# Patient Record
Sex: MALE | Race: WHITE | NOT HISPANIC OR LATINO | Employment: OTHER | URBAN - METROPOLITAN AREA
[De-identification: names, ages, dates, MRNs, and addresses within clinical notes are randomized per-mention and may not be internally consistent; named-entity substitution may affect disease eponyms.]

---

## 2017-08-21 LAB — HCV AB SER-ACNC: NEGATIVE

## 2018-10-04 ENCOUNTER — OFFICE VISIT (OUTPATIENT)
Dept: FAMILY MEDICINE CLINIC | Facility: CLINIC | Age: 73
End: 2018-10-04
Payer: COMMERCIAL

## 2018-10-04 VITALS
WEIGHT: 206.9 LBS | TEMPERATURE: 97.6 F | OXYGEN SATURATION: 95 % | HEIGHT: 72 IN | BODY MASS INDEX: 28.02 KG/M2 | SYSTOLIC BLOOD PRESSURE: 132 MMHG | RESPIRATION RATE: 20 BRPM | HEART RATE: 71 BPM | DIASTOLIC BLOOD PRESSURE: 74 MMHG

## 2018-10-04 DIAGNOSIS — N40.1 BENIGN PROSTATIC HYPERPLASIA WITH LOWER URINARY TRACT SYMPTOMS, SYMPTOM DETAILS UNSPECIFIED: Chronic | ICD-10-CM

## 2018-10-04 DIAGNOSIS — M35.3 PMR (POLYMYALGIA RHEUMATICA) (HCC): ICD-10-CM

## 2018-10-04 DIAGNOSIS — E78.5 HYPERLIPIDEMIA, UNSPECIFIED HYPERLIPIDEMIA TYPE: Chronic | ICD-10-CM

## 2018-10-04 DIAGNOSIS — G47.00 INSOMNIA, UNSPECIFIED TYPE: Primary | ICD-10-CM

## 2018-10-04 DIAGNOSIS — Z79.52 LONG TERM SYSTEMIC STEROID USER: ICD-10-CM

## 2018-10-04 PROBLEM — Z79.899 METHOTREXATE, LONG TERM, CURRENT USE: Status: ACTIVE | Noted: 2018-08-23

## 2018-10-04 PROBLEM — Z79.631 METHOTREXATE, LONG TERM, CURRENT USE: Status: ACTIVE | Noted: 2018-08-23

## 2018-10-04 PROBLEM — D47.2 MONOCLONAL GAMMOPATHY: Status: ACTIVE | Noted: 2017-09-06

## 2018-10-04 PROCEDURE — G0439 PPPS, SUBSEQ VISIT: HCPCS | Performed by: FAMILY MEDICINE

## 2018-10-04 PROCEDURE — 1160F RVW MEDS BY RX/DR IN RCRD: CPT | Performed by: FAMILY MEDICINE

## 2018-10-04 PROCEDURE — 90662 IIV NO PRSV INCREASED AG IM: CPT

## 2018-10-04 PROCEDURE — 99214 OFFICE O/P EST MOD 30 MIN: CPT | Performed by: FAMILY MEDICINE

## 2018-10-04 PROCEDURE — 1160F RVW MEDS BY RX/DR IN RCRD: CPT

## 2018-10-04 PROCEDURE — 1125F AMNT PAIN NOTED PAIN PRSNT: CPT

## 2018-10-04 PROCEDURE — G0008 ADMIN INFLUENZA VIRUS VAC: HCPCS

## 2018-10-04 PROCEDURE — 1170F FXNL STATUS ASSESSED: CPT

## 2018-10-04 RX ORDER — ALFUZOSIN HYDROCHLORIDE 10 MG/1
10 TABLET, EXTENDED RELEASE ORAL DAILY
COMMUNITY
End: 2019-10-07 | Stop reason: ALTCHOICE

## 2018-10-04 RX ORDER — PREDNISONE 1 MG/1
5 TABLET ORAL DAILY
COMMUNITY
Start: 2018-05-30 | End: 2019-10-07 | Stop reason: ALTCHOICE

## 2018-10-04 RX ORDER — ZOLPIDEM TARTRATE 10 MG/1
10 TABLET ORAL
Qty: 90 TABLET | Refills: 0 | Status: SHIPPED | OUTPATIENT
Start: 2018-10-04 | End: 2019-10-07 | Stop reason: SDUPTHER

## 2018-10-04 RX ORDER — FOLIC ACID 1 MG/1
1 TABLET ORAL DAILY
COMMUNITY
Start: 2018-08-23 | End: 2019-10-07 | Stop reason: ALTCHOICE

## 2018-10-04 RX ORDER — HYDROXYCHLOROQUINE SULFATE 200 MG/1
200 TABLET, FILM COATED ORAL 2 TIMES DAILY
COMMUNITY
Start: 2018-06-21 | End: 2019-10-07 | Stop reason: ALTCHOICE

## 2018-10-04 NOTE — PATIENT INSTRUCTIONS
IF YOU HAVE NEVER HAD A TDAP SHOT (TETANUS/DIPHTHERIA/PERTUSSIS)  TALK TO YOUR PHARMACIST ABOUT GETTING ONE : GOOD FOR 10 YRS:ESPECIALLY IMPORTATN IF YOU HAVE YOUNG CHILDREN OR GRANDCHILDREN    GET A YEARLY FLU SHOT IN THE FALL : IF OVER 65 GET "HIGH DOSE"     ASK PHARMACIST ABOUT "SHINGRIX" THE NEW SHINGLES VACCINE IF OVER AGE 50    Colonoscopy due soon     CURRENT [employer] TIPS ON EXERCISE:    IF YOU HAVE CONDITIONS THAT PREVENT AEROBIC EXERCISE:    WALK 30 MINUTES AT LEAST 5 DAYS PER WEEK; MAY BREAK IT UP INTO 10-  15 MINUTE SESSIONS   GET SOME RESISTANCE EXERCISES USING THE MAJOR MUSCLE GROUPS 2-3 TIMES PER WEEK     IF YOU ARE PHYSICALLY ABLE:    TRY TO GET 10,000 STEPS 3 TIMES PER WEEK  PLUS  GO "ONLINE" TO CHECK [employer] HEART RATE FOR YOUR AGE AND DO AEROBIC EXERCISES (JOG/STATIONARY BIKE/ELLIPTICAL) FOR 20-30 MINUTES 2-3 TIMES PER WEEK

## 2018-10-04 NOTE — PROGRESS NOTES
Assessment/Plan:    HLD (hyperlipidemia)  Low hdl       Diagnoses and all orders for this visit:    Insomnia, unspecified type  -     influenza vaccine, 4485-7151, high-dose, PF 0 5 mL, for patients 65 yr+ (FLUZONE HIGH-DOSE)  -     zolpidem (AMBIEN) 10 mg tablet; Take 1 tablet (10 mg total) by mouth daily at bedtime as needed for sleep    Long term systemic steroid user    Hyperlipidemia, unspecified hyperlipidemia type    Benign prostatic hyperplasia with lower urinary tract symptoms, symptom details unspecified    PMR (polymyalgia rheumatica) ([facility])    Other orders  -     alfuzosin (UROXATRAL) 10 mg 24 hr tablet; Take 10 mg by mouth daily  -     folic acid (FOLVITE) 1 mg tablet; Take 1 mg by mouth daily  -     hydroxychloroquine (PLAQUENIL) 200 mg tablet; Take 200 mg by mouth 2 (two) times a day  -     predniSONE 5 mg tablet; Take 5 mg by mouth daily          Subjective:      Patient ID: Benton Capps is a 72 y o  male  Patient comes today for his regular follow-up of chronic conditions  Also update on the Medicare wellness visit  In March she had laminectomy which was quite successful in relieving spinal stenosis symptoms  He remains on low-dose prednisone and methotrexate for the PMR and rheumatoid arthritis  He had some med changes for the BPH  The following portions of the patient's history were reviewed and updated as appropriate: allergies, current medications, past family history, past medical history, past social history, past surgical history and problem list     Review of Systems   Constitutional: Negative for activity change and appetite change  HENT: Negative for trouble swallowing  Respiratory: Negative for cough and shortness of breath  Cardiovascular: Negative for chest pain, palpitations and leg swelling  Gastrointestinal: Negative for abdominal pain, blood in stool and bowel incontinence  Endocrine: Negative for polyuria     Genitourinary: Positive for difficulty urinating and frequency  Negative for hematuria  Musculoskeletal: Positive for arthralgias  Negative for back pain  Skin: Negative for rash  Neurological: Negative for dizziness  Psychiatric/Behavioral: Negative for behavioral problems  The patient is not nervous/anxious  Objective:  Vitals:    10/04/18 0924   BP: 132/74   BP Location: Left arm   Patient Position: Sitting   Cuff Size: Standard   Pulse: 71   Resp: 20   Temp: 97 6 °F (36 4 °C)   SpO2: 95%   Weight: 93 8 kg (206 lb 14 4 oz)   Height: 6' (1 829 m)       Physical Exam   Constitutional: He appears well-developed and well-nourished  HENT:   Head: Normocephalic and atraumatic  Eyes: Conjunctivae are normal    Neck: Neck supple  No thyromegaly present  Cardiovascular: Normal rate, regular rhythm, normal heart sounds and intact distal pulses  No murmur heard  Pulmonary/Chest: Effort normal and breath sounds normal  No respiratory distress  Musculoskeletal: He exhibits no edema  Lymphadenopathy:     He has no cervical adenopathy  Skin: Skin is warm and dry  Psychiatric: He has a normal mood and affect  His behavior is normal        Assessment and Plan:    Problem List Items Addressed This Visit     Long term systemic steroid user    PMR (polymyalgia rheumatica) (HCC)    HLD (hyperlipidemia) (Chronic)     Low hdl         Benign prostatic hyperplasia with lower urinary tract symptoms (Chronic)      Other Visit Diagnoses     Insomnia, unspecified type    -  Primary    Relevant Medications    zolpidem (AMBIEN) 10 mg tablet    Other Relevant Orders    influenza vaccine, [id number], high-dose, PF 0 5 mL, for patients 65 yr+ (FLUZONE HIGH-DOSE)        There are no preventive care reminders to display for this patient  HPI:  Srinath Calloway is a 72 y o  male here for his Subsequent Wellness Visit      Patient Active Problem List   Diagnosis    Long term systemic steroid user    Methotrexate, long term, current use    Monoclonal gammopathy    PMR (polymyalgia rheumatica) (HCC)    HLD (hyperlipidemia)    Benign prostatic hyperplasia with lower urinary tract symptoms     Past Medical History:   Diagnosis Date    Appendicitis     AGE 37    Depression     Deviated nasal septum 03/1996    Head trauma 11/2003    DUE TO FALL; HOSPITALIZATION    Heart murmur     ONSET AT AGE 23    Herniated disc, cervical 06/1996     Past Surgical History:   Procedure Laterality Date    APPENDECTOMY      AGE 37    BACK SURGERY      CHOLECYSTECTOMY      CLAVICLE SURGERY Left     HARDWARE IN    DEBRIDEMENT TENNIS ELBOW Right     R TENNIS ELBOW SX    LUMBAR [place] [address] [place] SURGERY  06/1996    HERNIATED [address] [place]; X2    RHINOPLASTY  03/1996    DEDVIATED NASAL SEPTUM    UVULOPALATOPHARYNGOPLASTY      LISTED 2 X; RESOLVED WITH SURGERY     Family History   Problem Relation Age of Onset    Heart failure Father     Brain cancer Sister      History   Smoking Status    Never Smoker   Smokeless Tobacco    Not on file     Comment: FORMER SMOKER, CIGARETTE TOBACCO USE; DATE QUIT: 1/1/03; NO PASSIVE SMOKE EXPOSURE AS PER [employer]     History   Alcohol Use No      History   Drug Use No       Current Outpatient Prescriptions   Medication Sig Dispense Refill    alfuzosin (UROXATRAL) 10 mg 24 hr tablet Take 10 mg by mouth daily      folic acid (FOLVITE) 1 mg tablet Take 1 mg by mouth daily      hydroxychloroquine (PLAQUENIL) 200 mg tablet Take 200 mg by mouth 2 (two) times a day      predniSONE 5 mg tablet Take 5 mg by mouth daily      zolpidem (AMBIEN) 10 mg tablet Take 1 tablet (10 mg total) by mouth daily at bedtime as needed for sleep 90 tablet 0     No current facility-administered medications for this visit        Allergies   Allergen Reactions    Bee Venom Anaphylaxis     One type of bee  Looks like a yellow striped wasp    Other      Other reaction(s): Decreased Blood Pressure    Codeine Other (See Comments) and Hallucinations     halluctnations There is no immunization history for the selected administration types on file for this patient  Patient Care Team:  Gerri Ortiz MD as PCP - General    Medicare Screening Tests and Risk Assessments:  Last Medicare Wellness visit information reviewed, patient interviewed, no change since last AWV  Health Risk Assessment:  Patient rates overall health as very good  Patient feels that their physical health rating is Much better  Eyesight was rated as Same  Hearing was rated as Same  Patient feels that their emotional and mental health rating is Much better  Pain experienced by patient in the last 7 days has been Some  Patient's pain rating has been 2/10  Patient states that he has experienced no weight loss or gain in last 6 months  Emotional/Mental Health:  Patient has been feeling nervous/anxious  PHQ-9 Depression Screening:    Frequency of the following problems over the past two weeks:      1  Little interest or pleasure in doing things: 0 - not at all      2  Feeling down, depressed, or hopeless: 0 - not at all  PHQ-2 Score: 0          Broken Bones/Falls: Fall Risk Assessment:    In the past year, patient has experienced: No history of falling in past year          Bladder/Bowel:  Patient has not leaked urine accidently in the last six months  Patient reports no loss of bowel control  Immunizations:  Patient has had a flu vaccination within the last year  Patient has received a pneumonia shot  Patient has received a shingles shot  Patient has received tetanus/diphtheria shot   (Additional Comments: Updating shingles vaccine )    Home Safety:  Patient currently reports that there are working smoke alarms,     Preventative Screenings:   prostate cancer screen performed, colon cancer screen completed, (Additional Comments: Due soon )    Nutrition:  Current diet: Regular with servings of the following:    Medications:  Patient is not currently taking any over-the-counter supplements  Patient is able to manage medications  Lifestyle Choices:  Patient reports no tobacco use  Patient has smoked or used tobacco in the past   Patient has stopped his tobacco use  Patient reports no alcohol use  Patient drives a vehicle  Patient wears seat belt  Activities of Daily Living:  Can get out of bed by his or her self, able to dress self, able to make own meals, able to do own shopping, able to bathe self, can do own laundry/housekeeping, can manage own money, pay bills and track expenses    Previous Hospitalizations: Additional Comments: [facility]    Advanced Directives:  Patient has decided on a power of   Patient has spoken to designated power of   Patient has completed advanced directive  Preventative Screening/Counseling:      Cardiovascular:      General: Screening Current          Diabetes:      General: Screening Current          Colorectal Cancer:      General: Screening Current          Prostate Cancer:      General: Screening Current          Osteoporosis:      General: Risks and Benefits Discussed      Comments: Take a good multivitamin and calcium         AAA:      General: Screening Current          Glaucoma:      General: Screening Current          HIV:      General: Screening Not Indicated          Hepatitis C:      General: Screening Not Indicated        Advanced Directives:   Patient has living will for healthcare, has durable POA for healthcare, patient has an advanced directive       Immunizations:  Patient reviewed and up to date  Additional Comments: Reviewed zostavax

## 2019-10-07 ENCOUNTER — OFFICE VISIT (OUTPATIENT)
Dept: FAMILY MEDICINE CLINIC | Facility: CLINIC | Age: 74
End: 2019-10-07
Payer: COMMERCIAL

## 2019-10-07 VITALS
TEMPERATURE: 98.3 F | HEIGHT: 72 IN | DIASTOLIC BLOOD PRESSURE: 100 MMHG | SYSTOLIC BLOOD PRESSURE: 150 MMHG | HEART RATE: 62 BPM | WEIGHT: 202.2 LBS | OXYGEN SATURATION: 97 % | BODY MASS INDEX: 27.39 KG/M2

## 2019-10-07 DIAGNOSIS — M54.16 LUMBAR RADICULOPATHY: ICD-10-CM

## 2019-10-07 DIAGNOSIS — E78.49 OTHER HYPERLIPIDEMIA: Chronic | ICD-10-CM

## 2019-10-07 DIAGNOSIS — Z79.52 LONG TERM SYSTEMIC STEROID USER: ICD-10-CM

## 2019-10-07 DIAGNOSIS — Z12.11 ENCOUNTER FOR SCREENING COLONOSCOPY: ICD-10-CM

## 2019-10-07 DIAGNOSIS — N40.1 BENIGN PROSTATIC HYPERPLASIA WITH LOWER URINARY TRACT SYMPTOMS, SYMPTOM DETAILS UNSPECIFIED: Chronic | ICD-10-CM

## 2019-10-07 DIAGNOSIS — G47.00 INSOMNIA, UNSPECIFIED TYPE: ICD-10-CM

## 2019-10-07 DIAGNOSIS — Z23 IMMUNIZATION DUE: Primary | ICD-10-CM

## 2019-10-07 DIAGNOSIS — D47.2 MONOCLONAL GAMMOPATHY: ICD-10-CM

## 2019-10-07 DIAGNOSIS — Z00.00 MEDICARE ANNUAL WELLNESS VISIT, SUBSEQUENT: Chronic | ICD-10-CM

## 2019-10-07 DIAGNOSIS — M35.3 PMR (POLYMYALGIA RHEUMATICA) (HCC): ICD-10-CM

## 2019-10-07 PROBLEM — Z79.899 METHOTREXATE, LONG TERM, CURRENT USE: Status: RESOLVED | Noted: 2018-08-23 | Resolved: 2019-10-07

## 2019-10-07 PROBLEM — E78.6 LOW HDL (UNDER 40): Chronic | Status: ACTIVE | Noted: 2019-10-07

## 2019-10-07 PROBLEM — Z79.631 METHOTREXATE, LONG TERM, CURRENT USE: Status: RESOLVED | Noted: 2018-08-23 | Resolved: 2019-10-07

## 2019-10-07 LAB
CHOLEST SERPL-MCNC: 175 MG/DL (ref 50–200)
HCV AB SER QL: NORMAL
HDLC SERPL-MCNC: 32 MG/DL (ref 40–60)
LDLC SERPL CALC-MCNC: 103 MG/DL (ref 0–100)
NONHDLC SERPL-MCNC: 143 MG/DL
TRIGL SERPL-MCNC: 201 MG/DL

## 2019-10-07 PROCEDURE — 90662 IIV NO PRSV INCREASED AG IM: CPT

## 2019-10-07 PROCEDURE — G0008 ADMIN INFLUENZA VIRUS VAC: HCPCS

## 2019-10-07 PROCEDURE — 86803 HEPATITIS C AB TEST: CPT | Performed by: FAMILY MEDICINE

## 2019-10-07 PROCEDURE — 80061 LIPID PANEL: CPT | Performed by: FAMILY MEDICINE

## 2019-10-07 PROCEDURE — 99214 OFFICE O/P EST MOD 30 MIN: CPT | Performed by: FAMILY MEDICINE

## 2019-10-07 PROCEDURE — 36415 COLL VENOUS BLD VENIPUNCTURE: CPT | Performed by: FAMILY MEDICINE

## 2019-10-07 PROCEDURE — 1101F PT FALLS ASSESS-DOCD LE1/YR: CPT | Performed by: FAMILY MEDICINE

## 2019-10-07 PROCEDURE — 3008F BODY MASS INDEX DOCD: CPT | Performed by: FAMILY MEDICINE

## 2019-10-07 PROCEDURE — 1125F AMNT PAIN NOTED PAIN PRSNT: CPT

## 2019-10-07 PROCEDURE — 1170F FXNL STATUS ASSESSED: CPT

## 2019-10-07 PROCEDURE — G0439 PPPS, SUBSEQ VISIT: HCPCS | Performed by: FAMILY MEDICINE

## 2019-10-07 RX ORDER — ZOLPIDEM TARTRATE 10 MG/1
10 TABLET ORAL
Qty: 90 TABLET | Refills: 1 | Status: SHIPPED | OUTPATIENT
Start: 2019-10-07 | End: 2020-10-13 | Stop reason: SDUPTHER

## 2019-10-07 NOTE — PATIENT INSTRUCTIONS
Medicare Preventive Visit Patient Instructions  Thank you for completing your Welcome to Medicare Visit or Medicare Annual Wellness Visit today  Your next wellness visit will be due in one year (10/7/2020)  The screening/preventive services that you may require over the next 5-10 years are detailed below  Some tests may not apply to you based off risk factors and/or age  Screening tests ordered at today's visit but not completed yet may show as past due  Also, please note that scanned in results may not display below  Preventive Screenings:  Service Recommendations Previous Testing/Comments   Colorectal Cancer Screening  · Colonoscopy    · Fecal Occult Blood Test (FOBT)/Fecal Immunochemical Test (FIT)  · Fecal DNA/Cologuard Test  · Flexible Sigmoidoscopy Age: 54-65 years old   Colonoscopy: every 10 years (May be performed more frequently if at higher risk)  OR  FOBT/FIT: every 1 year  OR  Cologuard: every 3 years  OR  Sigmoidoscopy: every 5 years  Screening may be recommended earlier than age 48 if at higher risk for colorectal cancer  Also, an individualized decision between you and your healthcare provider will decide whether screening between the ages of 74-80 would be appropriate   Colonoscopy: Not on file  FOBT/FIT: Not on file  Cologuard: Not on file  Sigmoidoscopy: Not on file         Prostate Cancer Screening Individualized decision between patient and health care provider in men between ages of 53-78   Medicare will cover every 12 months beginning on the day after your 50th birthday PSA: No results in last 5 years          Hepatitis C Screening Once for adults born between [facility]  More frequently in patients at high risk for Hepatitis C Hep C Antibody: Not on file       Diabetes Screening 1-2 times per year if you're at risk for diabetes or have pre-diabetes Fasting glucose: No results in last 5 years   A1C: No results in last 5 years       Cholesterol Screening Once every 5 years if you don't have a lipid disorder  May order more often based on risk factors  Lipid panel: Not on file    Screening Not Indicated  History Lipid Disorder      Other Preventive Screenings Covered by Medicare:  1  Abdominal Aortic Aneurysm (AAA) Screening: covered once if your at risk  You're considered to be at risk if you have a family history of AAA or a male between the age of 73-68 who smoking at least 100 cigarettes in your lifetime  2  Lung Cancer Screening: covers low dose CT scan once per year if you meet all of the following conditions: (1) Age 50-69; (2) No signs or symptoms of lung cancer; (3) Current smoker or have quit smoking within the last 15 years; (4) You have a tobacco smoking history of at least 30 pack years (packs per day x number of years you smoked); (5) You get a written order from a healthcare provider  3  Glaucoma Screening: covered annually if you're considered high risk: (1) You have diabetes OR (2) Family history of glaucoma OR (3)  aged 48 and older OR (3)  American aged 72 and older  3  Osteoporosis Screening: covered every 2 years if you meet one of the following conditions: (1) Have a vertebral abnormality; (2) On glucocorticoid therapy for more than 3 months; (3) Have primary hyperparathyroidism; (4) On osteoporosis medications and need to assess response to drug therapy  5  HIV Screening: covered annually if you're between the age of 12-76  Also covered annually if you are younger than 13 and older than 72 with risk factors for HIV infection  For pregnant patients, it is covered up to 3 times per pregnancy      Immunizations:  Immunization Recommendations   Influenza Vaccine Annual influenza vaccination during flu season is recommended for all persons aged >= 6 months who do not have contraindications   Pneumococcal Vaccine (Prevnar and Pneumovax)  * Prevnar = PCV13  * Pneumovax = PPSV23 Adults 25-60 years old: 1-3 doses may be recommended based on certain risk factors  Adults 72 years old: Prevnar (PCV13) vaccine recommended followed by Pneumovax (PPSV23) vaccine  If already received PPSV23 since turning 65, then PCV13 recommended at least one year after PPSV23 dose  Hepatitis B Vaccine 3 dose series if at intermediate or high risk (ex: diabetes, end stage renal disease, liver disease)   Tetanus (Td) Vaccine - COST NOT COVERED BY MEDICARE PART B Following completion of primary series, a booster dose should be given every 10 years to maintain immunity against tetanus  Td may also be given as tetanus wound prophylaxis  Tdap Vaccine - COST NOT COVERED BY MEDICARE PART B Recommended at least once for all adults  For pregnant patients, recommended with each pregnancy  Shingles Vaccine (Shingrix) - COST NOT COVERED BY MEDICARE PART B  2 shot series recommended in those aged 48 and above     Health Maintenance Due:      Topic Date Due    Hepatitis C Screening  1945    CRC Screening: Colonoscopy  1945     Immunizations Due:      Topic Date Due    INFLUENZA VACCINE  07/01/2019     Advance Directives   What are advance directives? Advance directives are legal documents that state your wishes and plans for medical care  These plans are made ahead of time in case you lose your ability to make decisions for yourself  Advance directives can apply to any medical decision, such as the treatments you want, and if you want to donate organs  What are the types of advance directives? There are many types of advance directives, and each state has rules about how to use them  You may choose a combination of any of the following:  · Living will: This is a written record of the treatment you want  You can also choose which treatments you do not want, which to limit, and which to stop at a certain time  This includes surgery, medicine, IV fluid, and tube feedings  · Durable power of  for healthcare [place]):   This is a written record that states who you want to make healthcare choices for you when you are unable to make them for yourself  This person, called a proxy, is usually a family member or a friend  You may choose more than 1 proxy  · Do not resuscitate (DNR) order:  A DNR order is used in case your heart stops beating or you stop breathing  It is a request not to have certain forms of treatment, such as CPR  A DNR order may be included in other types of advance directives  · Medical directive: This covers the care that you want if you are in a coma, near death, or unable to make decisions for yourself  You can list the treatments you want for each condition  Treatment may include pain medicine, surgery, blood transfusions, dialysis, IV or tube feedings, and a ventilator (breathing machine)  · Values history: This document has questions about your views, beliefs, and how you feel and think about life  This information can help others choose the care that you would choose  Why are advance directives important? An advance directive helps you control your care  Although spoken wishes may be used, it is better to have your wishes written down  Spoken wishes can be misunderstood, or not followed  Treatments may be given even if you do not want them  An advance directive may make it easier for your family to make difficult choices about your care  Weight Management   Why it is important to manage your weight:  Being overweight increases your risk of health conditions such as heart disease, high blood pressure, type 2 diabetes, and certain types of cancer  It can also increase your risk for osteoarthritis, sleep apnea, and other respiratory problems  Aim for a slow, steady weight loss  Even a small amount of weight loss can lower your risk of health problems  How to lose weight safely:  A safe and healthy way to lose weight is to eat fewer calories and get regular exercise   You can lose up about 1 pound a week by decreasing the number of calories you eat by 500 calories each day  Healthy meal plan for weight management:  A healthy meal plan includes a variety of foods, contains fewer calories, and helps you stay healthy  A healthy meal plan includes the following:  · Eat whole-grain foods more often  A healthy meal plan should contain fiber  Fiber is the part of grains, fruits, and vegetables that is not broken down by your body  Whole-grain foods are healthy and provide extra fiber in your diet  Some examples of whole-grain foods are whole-wheat breads and pastas, oatmeal, brown rice, and bulgur  · Eat a variety of vegetables every day  Include dark, leafy greens such as spinach, kale, salbador greens, and mustard greens  Eat yellow and orange vegetables such as carrots, sweet potatoes, and winter squash  · Eat a variety of fruits every day  Choose fresh or canned fruit (canned in its own juice or light syrup) instead of juice  Fruit juice has very little or no fiber  · Eat low-fat dairy foods  Drink fat-free (skim) milk or 1% milk  Eat fat-free yogurt and low-fat cottage cheese  Try low-fat cheeses such as mozzarella and other reduced-fat cheeses  · Choose meat and other protein foods that are low in fat  Choose beans or other legumes such as split peas or lentils  Choose fish, skinless poultry (chicken or turkey), or lean cuts of red meat (beef or pork)  Before you cook meat or poultry, cut off any visible fat  · Use less fat and oil  Try baking foods instead of frying them  Add less fat, such as margarine, sour cream, regular salad dressing and mayonnaise to foods  Eat fewer high-fat foods  Some examples of high-fat foods include french fries, doughnuts, ice cream, and cakes  · Eat fewer sweets  Limit foods and drinks that are high in sugar  This includes candy, cookies, regular soda, and sweetened drinks  Exercise:  Exercise at least 30 minutes per day on most days of the week  Some examples of exercise include walking, biking, dancing, and swimming  You can also fit in more physical activity by taking the stairs instead of the elevator or parking farther away from stores  Ask your healthcare provider about the best exercise plan for you  © Copyright [employer] 2018 Information is for End User's use only and may not be sold, redistributed or otherwise used for commercial purposes  All illustrations and images included in CareNotes® are the copyrighted property of A D A M , Inc  or [employer] [facility]        Use Hydrocortisone 1 % cream to ear canal : avoid water in there and dry the canal after showers  Consider using saw palmetto over-the-counter for prostate health  Given about a month and see which I think  Consider a formal physical therapy program in [place] if you are not able to get the muscles back in shape on her own or if the right knee continues to bother may be some physical therapy that would help that  Call your gastroenterologist and set up a consult for the spring when you return for the colonoscopy  [phone]               IF YOU HAVE NEVER HAD A TDAP SHOT (TETANUS/DIPHTHERIA/PERTUSSIS)  TALK TO YOUR PHARMACIST ABOUT GETTING ONE : GOOD FOR 10 YRS:ESPECIALLY IMPORTATN IF YOU HAVE YOUNG CHILDREN OR GRANDCHILDREN    GET A YEARLY FLU SHOT IN THE FALL : IF OVER 65 GET "HIGH DOSE"     ASK PHARMACIST ABOUT "SHINGRIX" THE NEW SHINGLES VACCINE IF OVER AGE 50

## 2019-10-07 NOTE — PROGRESS NOTES
Assessment/Plan:    No problem-specific Assessment & Plan notes found for this encounter  Diagnoses and all orders for this visit:    Immunization due  -     influenza vaccine, [id number], high-dose, PF 0 5 mL (FLUZONE HIGH-DOSE)    Encounter for screening colonoscopy    PMR (polymyalgia rheumatica) ([facility] [address] )    Other hyperlipidemia    Benign prostatic hyperplasia with lower urinary tract symptoms, symptom details unspecified    Lumbar radiculopathy    Long term systemic steroid user    Monoclonal gammopathy    Insomnia, unspecified type    Medicare annual wellness visit, subsequent    Other orders  -     Cancel: Ambulatory referral to Gastroenterology; Future          Subjective:      Patient ID: Bridger Schwartz is a 69 y o  male  PATIENT RETURNS FOR FOLLOW-UP OF CHRONIC MEDICAL CONDITIONS  NO HOSPITAL STAYS OR EMERGENCY VISITS RECENTLY  MEDS WERE REVIEWED AND NO SIDE EFFECTS  NO NEW ISSUES  UNLESS NOTED BELOW  NO NEW MEDICAL PROVIDER REPORTED  THE CHRONIC DISEASES LISTED ABOVE ARE STABLE AND UNCHANGED/ THE PLAN OF CARE FOR THOSE WILL REMAIN UNCHANGED UNLESS NOTED BELOW         AWV/Sub     Had spinal surgery last yr and did well ;     Rheum issues : off all meds ; using spinach/ kale smoothies  R knee pain : MRI = Deg dx ; The following portions of the patient's history were reviewed and updated as appropriate: allergies, current medications, past family history, past medical history, past social history, past surgical history and problem list     Review of Systems   Constitutional: Negative for activity change and appetite change  HENT: Negative for trouble swallowing  Eyes: Negative for visual disturbance  Respiratory: Negative for cough and shortness of breath  Cardiovascular: Negative for chest pain, palpitations and leg swelling  Gastrointestinal: Negative for abdominal pain and blood in stool  Endocrine: Negative for polyuria     Genitourinary: Negative for difficulty urinating and hematuria  Skin: Negative for rash  Neurological: Negative for dizziness  Psychiatric/Behavioral: Negative for behavioral problems  Objective:  Vitals:    10/07/19 0853   BP: 150/100   BP Location: Left arm   Patient Position: Sitting   Cuff Size: Adult   Pulse: 62   Temp: 98 3 °F (36 8 °C)   TempSrc: Temporal   SpO2: 97%   Weight: 91 7 kg (202 lb 3 2 oz)   Height: 6' (1 829 m)      Physical Exam   Constitutional: He appears well-developed and well-nourished  HENT:   Head: Normocephalic and atraumatic  Eyes: Conjunctivae are normal    Neck: Neck supple  No thyromegaly present  Cardiovascular: Normal rate, regular rhythm, normal heart sounds and intact distal pulses  No murmur heard  Pulmonary/Chest: Effort normal and breath sounds normal  No respiratory distress  Musculoskeletal: He exhibits no edema  Lymphadenopathy:     He has no cervical adenopathy  Skin: Skin is warm and dry  Psychiatric: He has a normal mood and affect  His behavior is normal          Patient's chronic problems that were reviewed today are stable  Meds reviewed and no changes made  Appropriate labs and imaging were ordered  Preventive measures appropriate for age and sex were reviewed with patient  Immunizations were updated as appropriate      Assessment and Plan:     Problem List Items Addressed This Visit        Nervous and Auditory    Lumbar radiculopathy       Genitourinary    Benign prostatic hyperplasia with lower urinary tract symptoms (Chronic)       Other    HLD (hyperlipidemia) (Chronic)    Medicare annual wellness visit, subsequent (Chronic)    Long term systemic steroid user    Monoclonal gammopathy    PMR (polymyalgia rheumatica) (HCC)      Other Visit Diagnoses     Immunization due    -  Primary    Encounter for screening colonoscopy        Insomnia, unspecified type               Preventive health issues were discussed with patient, and age appropriate screening tests were ordered as noted in patient's After Visit Summary  Personalized health advice and appropriate referrals for health education or preventive services given if needed, as noted in patient's After Visit Summary       History of Present Illness:     Patient presents for Medicare Annual Wellness visit    Patient Care Team:  Brittany Nair MD as PCP - General     Problem List:     Patient Active Problem List   Diagnosis    Long term systemic steroid user    Monoclonal gammopathy    PMR (polymyalgia rheumatica) (HCC)    HLD (hyperlipidemia)    Benign prostatic hyperplasia with lower urinary tract symptoms    Lumbar radiculopathy    Medicare annual wellness visit, subsequent      Past Medical and Surgical History:     Past Medical History:   Diagnosis Date    Appendicitis     AGE 37    Depression     Deviated nasal septum 03/1996    Head trauma 11/2003    DUE TO FALL; HOSPITALIZATION    Heart murmur     ONSET AT AGE 23    Herniated disc, cervical 06/1996     Past Surgical History:   Procedure Laterality Date    APPENDECTOMY      AGE 37    BACK SURGERY      CHOLECYSTECTOMY      CLAVICLE SURGERY Left     HARDWARE IN    DEBRIDEMENT TENNIS ELBOW Right     R TENNIS ELBOW SX    LUMBAR [address] SURGERY  06/1996    HERNIATED [address]; X2    RHINOPLASTY  03/1996    DEDVIATED NASAL SEPTUM    UVULOPALATOPHARYNGOPLASTY      LISTED 2 X; RESOLVED WITH SURGERY      Family History:     Family History   Problem Relation Age of Onset    Heart failure Father     Brain cancer Sister       Social History:     Social History     Socioeconomic History    Marital status: /Civil Union     Spouse name: None    Number of children: None    Years of education: None    Highest education level: None   Occupational History    None   Social Needs    Financial resource strain: None    Food insecurity:     Worry: None     Inability: None    Transportation needs:     Medical: None     Non-medical: None   Tobacco Use    Smoking status: Former Smoker     Last attempt to quit: 2003     Years since quittin 7    Smokeless tobacco: Former User    Tobacco comment: [address], [address]; DATE QUIT: 03; NO PASSIVE SMOKE EXPOSURE AS PER [employer]   Substance and Sexual Activity    Alcohol use: No    Drug use: No    Sexual activity: None   Lifestyle    Physical activity:     Days per week: None     Minutes per session: None    Stress: None   Relationships    Social connections:     Talks on phone: None     Gets together: None     Attends Advent service: None     Active member of club or organization: None     Attends meetings of clubs or organizations: None     Relationship status: None    Intimate partner violence:     Fear of current or ex partner: None     Emotionally abused: None     Physically abused: None     Forced sexual activity: None   Other Topics Concern    None   Social History Narrative    None       Medications and Allergies:     Current Outpatient Medications   Medication Sig Dispense Refill    zolpidem (AMBIEN) 10 mg tablet Take 1 tablet (10 mg total) by mouth daily at bedtime as needed for sleep 90 tablet 0     No current facility-administered medications for this visit        Allergies   Allergen Reactions    Bee Venom Anaphylaxis     One type of bee  Looks like a yellow striped wasp    Other Rash     Other reaction(s): Decreased Blood Pressure    Codeine Other (See Comments) and Hallucinations     halluctnations      Immunizations:     Immunization History   Administered Date(s) Administered    INFLUENZA 2016, 10/02/2017    Influenza Split 2010    Influenza Split High Dose Preservative Free IM 2016, 10/04/2017    Influenza TIV (IM) 2008, 2009, 10/01/2016    Influenza, high dose seasonal 0 5 mL 10/04/2018    Pneumococcal Conjugate 13-Valent 2016    Pneumococcal Polysaccharide PPV23 2014    Tdap 2010      Health Maintenance:         Topic Date Due    Hepatitis C Screening  1945    CRC Screening: Colonoscopy  1945         Topic Date Due    INFLUENZA VACCINE  07/01/2019      Medicare Health Risk Assessment:     /100 (BP Location: Left arm, Patient Position: Sitting, Cuff Size: Adult)   Pulse 62   Temp 98 3 °F (36 8 °C) (Temporal)   Ht 6' (1 829 m)   Wt 91 7 kg (202 lb 3 2 oz)   SpO2 97%   BMI 27 42 kg/m²      Miller Pedersen is here for his Subsequent Wellness visit  Last Medicare Wellness visit information reviewed, patient interviewed, no change since last AWV       Depression Screening:   PHQ-2 Score: 2      PREVENTIVE SCREENINGS      Cardiovascular Screening:    General: Screening Not Indicated, History Lipid Disorder and Risks and Benefits Discussed      Diabetes Screening:     General: Screening Current      Colorectal Cancer Screening:       Due for: Colonoscopy - High Risk      Prostate Cancer Screening:    General: Screening Current and Risks and Benefits Discussed      Osteoporosis Screening:    General: Screening Not Indicated      Abdominal Aortic Aneurysm (AAA) Screening:    Risk factors include: age between 73-69 yo and tobacco use        General: Screening Current      Lung Cancer Screening:     General: Screening Not Indicated      Preventive Screening Comments: Colon to be done in spring       Dayana Koo MD

## 2019-10-07 NOTE — PROGRESS NOTES
Assessment and Plan:     Problem List Items Addressed This Visit     None      Visit Diagnoses     Immunization due    -  Primary    Encounter for screening colonoscopy               Preventive health issues were discussed with patient, and age appropriate screening tests were ordered as noted in patient's After Visit Summary  Personalized health advice and appropriate referrals for health education or preventive services given if needed, as noted in patient's After Visit Summary       History of Present Illness:     Patient presents for Medicare Annual Wellness visit    Patient Care Team:  Jenny Dooley MD as PCP - General     Problem List:     Patient Active Problem List   Diagnosis    Long term systemic steroid user    Methotrexate, long term, current use    Monoclonal gammopathy    PMR (polymyalgia rheumatica) (HCC)    HLD (hyperlipidemia)    Benign prostatic hyperplasia with lower urinary tract symptoms      Past Medical and Surgical History:     Past Medical History:   Diagnosis Date    Appendicitis     AGE 37    Depression     Deviated nasal septum 03/1996    Head trauma 11/2003    DUE TO FALL; HOSPITALIZATION    Heart murmur     ONSET AT AGE 23    Herniated disc, cervical 06/1996     Past Surgical History:   Procedure Laterality Date    APPENDECTOMY      AGE 37    BACK SURGERY      CHOLECYSTECTOMY      CLAVICLE SURGERY Left     HARDWARE IN    DEBRIDEMENT TENNIS ELBOW Right     R TENNIS ELBOW SX    LUMBAR [address] [place] SURGERY  06/1996    HERNIATED [address]; X2    RHINOPLASTY  03/1996    DEDVIATED NASAL SEPTUM    UVULOPALATOPHARYNGOPLASTY      LISTED 2 X; RESOLVED WITH SURGERY      Family History:     Family History   Problem Relation Age of Onset    Heart failure Father     Brain cancer Sister       Social History:     Social History     Socioeconomic History    Marital status: /Civil Union     Spouse name: None    Number of children: None    Years of education: None    Highest education level: None   Occupational History    None   Social Needs    Financial resource strain: None    Food insecurity:     Worry: None     Inability: None    Transportation needs:     Medical: None     Non-medical: None   Tobacco Use    Smoking status: Former Smoker     Last attempt to quit: 2003     Years since quittin 7    Smokeless tobacco: Former User    Tobacco comment: [address], [address]; DATE QUIT: 03; NO PASSIVE SMOKE EXPOSURE AS PER [employer]   Substance and Sexual Activity    Alcohol use: No    Drug use: No    Sexual activity: None   Lifestyle    Physical activity:     Days per week: None     Minutes per session: None    Stress: None   Relationships    Social connections:     Talks on phone: None     Gets together: None     Attends Scientology service: None     Active member of club or organization: None     Attends meetings of clubs or organizations: None     Relationship status: None    Intimate partner violence:     Fear of current or ex partner: None     Emotionally abused: None     Physically abused: None     Forced sexual activity: None   Other Topics Concern    None   Social History Narrative    None       Medications and Allergies:     Current Outpatient Medications   Medication Sig Dispense Refill    zolpidem (AMBIEN) 10 mg tablet Take 1 tablet (10 mg total) by mouth daily at bedtime as needed for sleep 90 tablet 0     No current facility-administered medications for this visit        Allergies   Allergen Reactions    Bee Venom Anaphylaxis     One type of bee  Looks like a yellow striped wasp    Other Rash     Other reaction(s): Decreased Blood Pressure    Codeine Other (See Comments) and Hallucinations     halluctnations      Immunizations:     Immunization History   Administered Date(s) Administered    INFLUENZA 2016, 10/02/2017    Influenza Split 2010    Influenza Split High Dose Preservative Free IM 2016, 10/04/2017    Influenza TIV (IM) 11/07/2008, 09/14/2009, 10/01/2016    Influenza, high dose seasonal 0 5 mL 10/04/2018    Pneumococcal Conjugate 13-Valent 09/30/2016    Pneumococcal Polysaccharide PPV23 07/17/2014    Tdap 12/22/2010      Health Maintenance:         Topic Date Due    Hepatitis C Screening  1945    CRC Screening: Colonoscopy  1945         Topic Date Due    INFLUENZA VACCINE  07/01/2019      Medicare Health Risk Assessment:     Ht 6' (1 829 m)   Wt 91 7 kg (202 lb 3 2 oz)   BMI 27 42 kg/m²      Luz Maria Hodgse is here for his Subsequent Wellness visit  Last Medicare Wellness visit information reviewed, patient interviewed and updates made to the record today  Health Risk Assessment:   Patient rates overall health as fair  Patient feels that their physical health rating is slightly better  Eyesight was rated as slightly worse  Hearing was rated as slightly worse  Patient feels that their emotional and mental health rating is same  Pain experienced in the last 7 days has been some  Patient's pain rating has been 4/10  Patient states that he has experienced no weight loss or gain in last 6 months  Depression Screening:   PHQ-2 Score: 2      Fall Risk Screening: In the past year, patient has experienced: no history of falling in past year      Home Safety:  Patient does not have trouble with stairs inside or outside of their home  Patient has working smoke alarms and has working carbon monoxide detector  Home safety hazards include: none  Nutrition:   Current diet is Regular  Medications:   Patient is not currently taking any over-the-counter supplements  Patient is able to manage medications  Activities of Daily Living (ADLs)/Instrumental Activities of Daily Living (IADLs):   Walk and transfer into and out of bed and chair?: Yes  Dress and groom yourself?: Yes    Bathe or shower yourself?: Yes    Feed yourself?  Yes  Do your laundry/housekeeping?: Yes  Manage your money, pay your bills and track your expenses?: Yes  Make your own meals?: Yes    Do your own shopping?: Yes    Previous Hospitalizations:   Any hospitalizations or ED visits within the last 12 months?: Yes    How many hospitalizations have you had in the last year?: 1-2    PREVENTIVE SCREENINGS      Cardiovascular Screening:    General: Screening Not Indicated and History Lipid Disorder      Abdominal Aortic Aneurysm (AAA) Screening:    Risk factors include: age between 73-69 yo and tobacco use        Lung Cancer Screening:     General: Screening Not Indicated      José De La Rosa MD

## 2020-10-13 ENCOUNTER — OFFICE VISIT (OUTPATIENT)
Dept: FAMILY MEDICINE CLINIC | Facility: CLINIC | Age: 75
End: 2020-10-13
Payer: COMMERCIAL

## 2020-10-13 VITALS
HEART RATE: 65 BPM | WEIGHT: 204.2 LBS | HEIGHT: 71 IN | DIASTOLIC BLOOD PRESSURE: 100 MMHG | BODY MASS INDEX: 28.59 KG/M2 | SYSTOLIC BLOOD PRESSURE: 172 MMHG | OXYGEN SATURATION: 97 % | TEMPERATURE: 98.2 F

## 2020-10-13 DIAGNOSIS — E78.6 LOW HDL (UNDER 40): Chronic | ICD-10-CM

## 2020-10-13 DIAGNOSIS — M54.16 LUMBAR RADICULOPATHY: ICD-10-CM

## 2020-10-13 DIAGNOSIS — M35.3 PMR (POLYMYALGIA RHEUMATICA) (HCC): ICD-10-CM

## 2020-10-13 DIAGNOSIS — Z23 IMMUNIZATION DUE: ICD-10-CM

## 2020-10-13 DIAGNOSIS — D47.2 MONOCLONAL GAMMOPATHY: ICD-10-CM

## 2020-10-13 DIAGNOSIS — N40.1 BENIGN PROSTATIC HYPERPLASIA WITH LOWER URINARY TRACT SYMPTOMS, SYMPTOM DETAILS UNSPECIFIED: Chronic | ICD-10-CM

## 2020-10-13 DIAGNOSIS — E78.49 OTHER HYPERLIPIDEMIA: Chronic | ICD-10-CM

## 2020-10-13 DIAGNOSIS — Z12.11 ENCOUNTER FOR SCREENING COLONOSCOPY: Primary | ICD-10-CM

## 2020-10-13 DIAGNOSIS — G47.00 INSOMNIA, UNSPECIFIED TYPE: ICD-10-CM

## 2020-10-13 DIAGNOSIS — Z00.00 MEDICARE ANNUAL WELLNESS VISIT, SUBSEQUENT: Chronic | ICD-10-CM

## 2020-10-13 PROCEDURE — 99214 OFFICE O/P EST MOD 30 MIN: CPT | Performed by: FAMILY MEDICINE

## 2020-10-13 PROCEDURE — G0008 ADMIN INFLUENZA VIRUS VAC: HCPCS

## 2020-10-13 PROCEDURE — 1170F FXNL STATUS ASSESSED: CPT | Performed by: FAMILY MEDICINE

## 2020-10-13 PROCEDURE — 1036F TOBACCO NON-USER: CPT | Performed by: FAMILY MEDICINE

## 2020-10-13 PROCEDURE — 90662 IIV NO PRSV INCREASED AG IM: CPT

## 2020-10-13 PROCEDURE — 1125F AMNT PAIN NOTED PAIN PRSNT: CPT | Performed by: FAMILY MEDICINE

## 2020-10-13 PROCEDURE — 3288F FALL RISK ASSESSMENT DOCD: CPT | Performed by: FAMILY MEDICINE

## 2020-10-13 PROCEDURE — G0439 PPPS, SUBSEQ VISIT: HCPCS | Performed by: FAMILY MEDICINE

## 2020-10-13 PROCEDURE — 1101F PT FALLS ASSESS-DOCD LE1/YR: CPT | Performed by: FAMILY MEDICINE

## 2020-10-13 PROCEDURE — 1160F RVW MEDS BY RX/DR IN RCRD: CPT | Performed by: FAMILY MEDICINE

## 2020-10-13 PROCEDURE — 3725F SCREEN DEPRESSION PERFORMED: CPT | Performed by: FAMILY MEDICINE

## 2020-10-13 RX ORDER — ZOLPIDEM TARTRATE 10 MG/1
10 TABLET ORAL
Qty: 90 TABLET | Refills: 3 | Status: SHIPPED | OUTPATIENT
Start: 2020-10-13 | End: 2021-10-14 | Stop reason: SDUPTHER

## 2021-03-05 DIAGNOSIS — Z23 ENCOUNTER FOR IMMUNIZATION: ICD-10-CM

## 2021-10-11 RX ORDER — MELOXICAM 15 MG/1
1 TABLET ORAL DAILY
COMMUNITY
End: 2021-10-14 | Stop reason: ALTCHOICE

## 2021-10-11 RX ORDER — HYDROCODONE BITARTRATE AND ACETAMINOPHEN 5; 325 MG/1; MG/1
1 TABLET ORAL 2 TIMES DAILY PRN
COMMUNITY
End: 2021-10-14 | Stop reason: ALTCHOICE

## 2021-10-11 RX ORDER — PREGABALIN 75 MG/1
1 CAPSULE ORAL 2 TIMES DAILY
COMMUNITY
End: 2021-10-14 | Stop reason: ALTCHOICE

## 2021-10-11 RX ORDER — DIAZEPAM 5 MG/1
1 TABLET ORAL EVERY 24 HOURS
COMMUNITY
End: 2021-10-14 | Stop reason: ALTCHOICE

## 2021-10-14 ENCOUNTER — TELEPHONE (OUTPATIENT)
Dept: ADMINISTRATIVE | Facility: OTHER | Age: 76
End: 2021-10-14

## 2021-10-14 ENCOUNTER — OFFICE VISIT (OUTPATIENT)
Dept: FAMILY MEDICINE CLINIC | Facility: CLINIC | Age: 76
End: 2021-10-14
Payer: COMMERCIAL

## 2021-10-14 VITALS
BODY MASS INDEX: 28.95 KG/M2 | DIASTOLIC BLOOD PRESSURE: 88 MMHG | HEIGHT: 71 IN | WEIGHT: 206.8 LBS | SYSTOLIC BLOOD PRESSURE: 134 MMHG | TEMPERATURE: 98 F | OXYGEN SATURATION: 97 % | HEART RATE: 64 BPM | RESPIRATION RATE: 18 BRPM

## 2021-10-14 DIAGNOSIS — M35.3 PMR (POLYMYALGIA RHEUMATICA) (HCC): ICD-10-CM

## 2021-10-14 DIAGNOSIS — N40.1 BENIGN PROSTATIC HYPERPLASIA WITH LOWER URINARY TRACT SYMPTOMS, SYMPTOM DETAILS UNSPECIFIED: Chronic | ICD-10-CM

## 2021-10-14 DIAGNOSIS — E78.49 OTHER HYPERLIPIDEMIA: Chronic | ICD-10-CM

## 2021-10-14 DIAGNOSIS — E78.6 LOW HDL (UNDER 40): Chronic | ICD-10-CM

## 2021-10-14 DIAGNOSIS — Z79.4 ENCOUNTER FOR LONG-TERM (CURRENT) USE OF INSULIN (HCC): ICD-10-CM

## 2021-10-14 DIAGNOSIS — G47.00 INSOMNIA, UNSPECIFIED TYPE: ICD-10-CM

## 2021-10-14 DIAGNOSIS — Z00.00 MEDICARE ANNUAL WELLNESS VISIT, SUBSEQUENT: Chronic | ICD-10-CM

## 2021-10-14 DIAGNOSIS — M23.207 OLD TEAR OF MENISCUS OF LEFT KNEE, UNSPECIFIED MENISCUS, UNSPECIFIED TEAR TYPE: Chronic | ICD-10-CM

## 2021-10-14 PROBLEM — M06.9 RHEUMATOID ARTHRITIS INVOLVING MULTIPLE SITES (HCC): Chronic | Status: ACTIVE | Noted: 2021-10-14

## 2021-10-14 PROCEDURE — 1170F FXNL STATUS ASSESSED: CPT | Performed by: FAMILY MEDICINE

## 2021-10-14 PROCEDURE — 3008F BODY MASS INDEX DOCD: CPT | Performed by: FAMILY MEDICINE

## 2021-10-14 PROCEDURE — 1036F TOBACCO NON-USER: CPT | Performed by: FAMILY MEDICINE

## 2021-10-14 PROCEDURE — G0439 PPPS, SUBSEQ VISIT: HCPCS | Performed by: FAMILY MEDICINE

## 2021-10-14 PROCEDURE — 3725F SCREEN DEPRESSION PERFORMED: CPT | Performed by: FAMILY MEDICINE

## 2021-10-14 PROCEDURE — 1125F AMNT PAIN NOTED PAIN PRSNT: CPT | Performed by: FAMILY MEDICINE

## 2021-10-14 PROCEDURE — 3288F FALL RISK ASSESSMENT DOCD: CPT | Performed by: FAMILY MEDICINE

## 2021-10-14 PROCEDURE — 1101F PT FALLS ASSESS-DOCD LE1/YR: CPT | Performed by: FAMILY MEDICINE

## 2021-10-14 PROCEDURE — 1160F RVW MEDS BY RX/DR IN RCRD: CPT | Performed by: FAMILY MEDICINE

## 2021-10-14 PROCEDURE — 99214 OFFICE O/P EST MOD 30 MIN: CPT | Performed by: FAMILY MEDICINE

## 2021-10-14 RX ORDER — SILODOSIN 4 MG/1
CAPSULE ORAL
Qty: 30 CAPSULE | Refills: 6 | Status: SHIPPED | OUTPATIENT
Start: 2021-10-14

## 2021-10-14 RX ORDER — ZOLPIDEM TARTRATE 10 MG/1
10 TABLET ORAL
Qty: 90 TABLET | Refills: 3 | Status: SHIPPED | OUTPATIENT
Start: 2021-10-14 | End: 2021-10-14 | Stop reason: SDUPTHER

## 2021-10-14 RX ORDER — ZOLPIDEM TARTRATE 10 MG/1
10 TABLET ORAL
Qty: 90 TABLET | Refills: 0 | Status: SHIPPED | OUTPATIENT
Start: 2021-10-14 | End: 2022-04-13 | Stop reason: SDUPTHER

## 2022-01-18 DIAGNOSIS — G47.00 INSOMNIA, UNSPECIFIED TYPE: ICD-10-CM

## 2022-01-18 RX ORDER — ZOLPIDEM TARTRATE 10 MG/1
10 TABLET ORAL
Qty: 90 TABLET | Refills: 0 | OUTPATIENT
Start: 2022-01-18

## 2022-01-18 NOTE — TELEPHONE ENCOUNTER
Per pharmacist at [facility] [address] Zolpidem got #[phone] 10/14/2021 [place] [facility] then [facility] [place]

## 2022-04-12 DIAGNOSIS — G47.00 INSOMNIA, UNSPECIFIED TYPE: ICD-10-CM

## 2022-04-12 RX ORDER — ZOLPIDEM TARTRATE 10 MG/1
10 TABLET ORAL
Qty: 90 TABLET | Refills: 0 | Status: CANCELLED | OUTPATIENT
Start: 2022-04-12

## 2022-04-13 DIAGNOSIS — G47.00 INSOMNIA, UNSPECIFIED TYPE: ICD-10-CM

## 2022-04-13 RX ORDER — ZOLPIDEM TARTRATE 10 MG/1
10 TABLET ORAL
Qty: 90 TABLET | Refills: 3 | Status: SHIPPED | OUTPATIENT
Start: 2022-04-13

## 2022-11-16 DIAGNOSIS — G47.00 INSOMNIA, UNSPECIFIED TYPE: ICD-10-CM

## 2022-11-16 RX ORDER — ZOLPIDEM TARTRATE 10 MG/1
10 TABLET ORAL
Qty: 90 TABLET | Refills: 3 | Status: SHIPPED | OUTPATIENT
Start: 2022-11-16

## 2023-06-13 RX ORDER — HYDROCODONE BITARTRATE AND ACETAMINOPHEN 5; 325 MG/1; MG/1
TABLET ORAL
COMMUNITY

## 2023-06-13 RX ORDER — PREGABALIN 75 MG/1
CAPSULE ORAL
COMMUNITY

## 2023-06-13 RX ORDER — MELOXICAM 15 MG/1
TABLET ORAL
COMMUNITY

## 2023-06-29 ENCOUNTER — TELEPHONE (OUTPATIENT)
Dept: FAMILY MEDICINE CLINIC | Facility: CLINIC | Age: 78
End: 2023-06-29

## 2023-06-30 ENCOUNTER — TRANSCRIBE ORDERS (OUTPATIENT)
Dept: FAMILY MEDICINE CLINIC | Facility: CLINIC | Age: 78
End: 2023-06-30

## 2023-06-30 DIAGNOSIS — N40.1 BENIGN PROSTATIC HYPERPLASIA WITH LOWER URINARY TRACT SYMPTOMS, SYMPTOM DETAILS UNSPECIFIED: Primary | Chronic | ICD-10-CM

## 2024-02-05 ENCOUNTER — PATIENT MESSAGE (OUTPATIENT)
Dept: FAMILY MEDICINE CLINIC | Facility: CLINIC | Age: 79
End: 2024-02-05

## 2024-02-05 DIAGNOSIS — G47.00 INSOMNIA, UNSPECIFIED TYPE: ICD-10-CM

## 2024-02-05 RX ORDER — ZOLPIDEM TARTRATE 10 MG/1
10 TABLET ORAL
Qty: 90 TABLET | Refills: 3 | Status: SHIPPED | OUTPATIENT
Start: 2024-02-05 | End: 2024-02-07 | Stop reason: SDUPTHER

## 2024-02-07 DIAGNOSIS — G47.00 INSOMNIA, UNSPECIFIED TYPE: ICD-10-CM

## 2024-02-07 RX ORDER — ZOLPIDEM TARTRATE 10 MG/1
10 TABLET ORAL
Qty: 90 TABLET | Refills: 3 | Status: SHIPPED | OUTPATIENT
Start: 2024-02-07

## 2024-02-07 RX ORDER — ZOLPIDEM TARTRATE 10 MG/1
10 TABLET ORAL
Qty: 90 TABLET | Refills: 3 | OUTPATIENT
Start: 2024-02-07

## 2024-05-15 ENCOUNTER — RA CDI HCC (OUTPATIENT)
Dept: OTHER | Facility: HOSPITAL | Age: 79
End: 2024-05-15

## 2024-05-15 PROBLEM — Z79.4 ENCOUNTER FOR LONG-TERM (CURRENT) USE OF INSULIN (HCC): Status: RESOLVED | Noted: 2019-10-07 | Resolved: 2024-05-15

## 2024-05-23 ENCOUNTER — OFFICE VISIT (OUTPATIENT)
Dept: FAMILY MEDICINE CLINIC | Facility: CLINIC | Age: 79
End: 2024-05-23
Payer: COMMERCIAL

## 2024-05-23 VITALS
OXYGEN SATURATION: 98 % | DIASTOLIC BLOOD PRESSURE: 62 MMHG | TEMPERATURE: 98 F | BODY MASS INDEX: 30.06 KG/M2 | HEART RATE: 70 BPM | SYSTOLIC BLOOD PRESSURE: 124 MMHG | WEIGHT: 210 LBS | HEIGHT: 70 IN

## 2024-05-23 DIAGNOSIS — M35.3 PMR (POLYMYALGIA RHEUMATICA) (HCC): ICD-10-CM

## 2024-05-23 DIAGNOSIS — M54.16 LUMBAR RADICULOPATHY: ICD-10-CM

## 2024-05-23 DIAGNOSIS — C91.10 CLL (CHRONIC LYMPHOCYTIC LEUKEMIA) (HCC): Chronic | ICD-10-CM

## 2024-05-23 DIAGNOSIS — G47.30 SLEEP APNEA, UNSPECIFIED TYPE: ICD-10-CM

## 2024-05-23 DIAGNOSIS — E78.49 OTHER HYPERLIPIDEMIA: Chronic | ICD-10-CM

## 2024-05-23 DIAGNOSIS — N40.1 BENIGN PROSTATIC HYPERPLASIA WITH LOWER URINARY TRACT SYMPTOMS, SYMPTOM DETAILS UNSPECIFIED: Chronic | ICD-10-CM

## 2024-05-23 DIAGNOSIS — E78.6 LOW HDL (UNDER 40): Primary | Chronic | ICD-10-CM

## 2024-05-23 DIAGNOSIS — Z79.52 LONG TERM SYSTEMIC STEROID USER: ICD-10-CM

## 2024-05-23 DIAGNOSIS — M06.9 RHEUMATOID ARTHRITIS INVOLVING MULTIPLE SITES, UNSPECIFIED WHETHER RHEUMATOID FACTOR PRESENT (HCC): Chronic | ICD-10-CM

## 2024-05-23 PROBLEM — D47.2 MONOCLONAL GAMMOPATHY: Status: RESOLVED | Noted: 2017-09-06 | Resolved: 2024-05-23

## 2024-05-23 PROCEDURE — 99214 OFFICE O/P EST MOD 30 MIN: CPT | Performed by: FAMILY MEDICINE

## 2024-05-23 PROCEDURE — G0439 PPPS, SUBSEQ VISIT: HCPCS | Performed by: FAMILY MEDICINE

## 2024-05-23 NOTE — PROGRESS NOTES
Assessment and Plan:     Problem List Items Addressed This Visit    None       Preventive health issues were discussed with patient, and age appropriate screening tests were ordered as noted in patient's After Visit Summary.  Personalized health advice and appropriate referrals for health education or preventive services given if needed, as noted in patient's After Visit Summary.     History of Present Illness:     Patient presents for a Medicare Wellness Visit    HPI   Patient Care Team:  Vishnu Jones MD as PCP - General     Review of Systems:     Review of Systems   Problem List:     Patient Active Problem List   Diagnosis    Long term systemic steroid user    Monoclonal gammopathy    PMR (polymyalgia rheumatica) (HCC)    HLD (hyperlipidemia)    Benign prostatic hyperplasia with lower urinary tract symptoms    Lumbar radiculopathy    Low HDL (under 40)    Rheumatoid arthritis involving multiple sites (HCC)    Old tear of meniscus of left knee      Past Medical and Surgical History:     Past Medical History:   Diagnosis Date    Appendicitis     AGE 35    Cancer (HCC)     leukemia    Depression     Deviated nasal septum 03/1996    Head trauma 11/2003    DUE TO FALL; HOSPITALIZATION    Heart murmur     ONSET AT AGE 23    Herniated disc, cervical 06/1996     Past Surgical History:   Procedure Laterality Date    APPENDECTOMY      AGE 35    BACK SURGERY      CHOLECYSTECTOMY      CLAVICLE SURGERY Left     HARDWARE IN    DEBRIDEMENT TENNIS ELBOW Right     R TENNIS ELBOW SX    LUMBAR DISC SURGERY  06/1996    HERNIATED DISC; X2    PROSTATE SURGERY  02/2024    RHINOPLASTY  03/1996    DEDVIATED NASAL SEPTUM    UVULOPALATOPHARYNGOPLASTY      LISTED 2 X; RESOLVED WITH SURGERY      Family History:     Family History   Problem Relation Age of Onset    Heart failure Father     Brain cancer Sister       Social History:     Social History     Socioeconomic History    Marital status: /Civil Union     Spouse name: None     Number of children: None    Years of education: None    Highest education level: None   Occupational History    None   Tobacco Use    Smoking status: Former     Current packs/day: 0.00     Types: Cigarettes     Quit date: 2003     Years since quittin.4    Smokeless tobacco: Former    Tobacco comments:     FORMER SMOKER, CIGARETTE TOBACCO USE; DATE QUIT: 03; NO PASSIVE SMOKE EXPOSURE AS PER [employer]   Substance and Sexual Activity    Alcohol use: No    Drug use: No    Sexual activity: Yes     Partners: Female   Other Topics Concern    None   Social History Narrative    None     Social Determinants of Health     Financial Resource Strain: Not on file   Food Insecurity: No Food Insecurity (2024)    Hunger Vital Sign     Worried About Running Out of Food in the Last Year: Never true     Ran Out of Food in the Last Year: Never true   Transportation Needs: No Transportation Needs (2024)    PRAPARE - Transportation     Lack of Transportation (Medical): No     Lack of Transportation (Non-Medical): No   Physical Activity: Not on file   Stress: Not on file   Social Connections: Not on file   Intimate Partner Violence: Not on file   Housing Stability: Low Risk  (2024)    Housing Stability Vital Sign     Unable to Pay for Housing in the Last Year: No     Number of Times Moved in the Last Year: 0     Homeless in the Last Year: No      Medications and Allergies:     Current Outpatient Medications   Medication Sig Dispense Refill    zolpidem (AMBIEN) 10 mg tablet Take 1 tablet (10 mg total) by mouth daily at bedtime as needed for sleep 90 tablet 3     No current facility-administered medications for this visit.     Allergies   Allergen Reactions    Bee Venom Anaphylaxis     One type of bee  Looks like a yellow striped wasp    Other Rash     Other reaction(s): Decreased Blood Pressure    Codeine Other (See Comments) and Hallucinations     halluctnations      Immunizations:     Immunization History    Administered Date(s) Administered    COVID-19 MODERNA VACC 0.5 ML IM 02/10/2021, 02/24/2021, 03/24/2021    H1N1 Inj 12/14/2009    INFLUENZA 09/30/2016, 10/02/2017    Influenza Split 12/22/2010    Influenza Split High Dose Preservative Free IM 09/30/2016, 10/04/2017, 11/05/2018    Influenza, high dose seasonal 0.7 mL 10/04/2018, 10/07/2019, 10/01/2020, 10/13/2020, 10/05/2021, 09/25/2022, 09/25/2023    Influenza, seasonal, injectable 11/07/2008, 09/14/2009, 10/01/2016    Pneumococcal Conjugate 13-Valent 09/30/2016    Pneumococcal Polysaccharide PPV23 07/17/2014, 01/01/2015    Tdap 12/22/2010, 11/17/2022      Health Maintenance:         Topic Date Due    Hepatitis C Screening  Completed    Colorectal Cancer Screening  Discontinued         Topic Date Due    COVID-19 Vaccine (4 - 2023-24 season) 09/01/2023      Medicare Screening Tests and Risk Assessments:         Health Risk Assessment:   Patient rates overall health as good. Patient feels that their physical health rating is same. Patient is satisfied with their life. Eyesight was rated as same. Hearing was rated as slightly worse. Patient feels that their emotional and mental health rating is same. Patients states they are sometimes angry. Patient states they are often unusually tired/fatigued. Pain experienced in the last 7 days has been some. Patient's pain rating has been 3/10. Patient states that he has experienced no weight loss or gain in last 6 months.     Depression Screening:   PHQ-2 Score: 2      Fall Risk Screening:   In the past year, patient has experienced: no history of falling in past year      Home Safety:  Patient does not have trouble with stairs inside or outside of their home. Patient has working smoke alarms and has no working carbon monoxide detector. Home safety hazards include: none.     Nutrition:   Current diet is Regular.     Medications:   Patient is currently taking over-the-counter supplements. OTC medications include: see medication  list. Patient is able to manage medications.     Activities of Daily Living (ADLs)/Instrumental Activities of Daily Living (IADLs):   Walk and transfer into and out of bed and chair?: Yes  Dress and groom yourself?: Yes    Bathe or shower yourself?: Yes    Feed yourself? Yes  Do your laundry/housekeeping?: Yes  Manage your money, pay your bills and track your expenses?: Yes  Make your own meals?: Yes    Do your own shopping?: Yes    Durable Medical Equipment Suppliers  [facility]    Previous Hospitalizations:   Any hospitalizations or ED visits within the last 12 months?: No      Advance Care Planning:   Living will: Yes    Durable POA for healthcare: Yes    Advanced directive: Yes      PREVENTIVE SCREENINGS      Cardiovascular Screening:    General: Screening Not Indicated and History Lipid Disorder      Prostate Cancer Screening:    General: Screening Not Indicated      Abdominal Aortic Aneurysm (AAA) Screening:    Risk factors include: tobacco use        Lung Cancer Screening:     General: Screening Not Indicated      Hepatitis C Screening:    General: Screening Current    Screening, Brief Intervention, and Referral to Treatment (SBIRT)    Screening  Typical number of drinks in a day: 0  Typical number of drinks in a week: 0  Interpretation: Low risk drinking behavior.    AUDIT-C Screenin) How often did you have a drink containing alcohol in the past year? monthly or less  2) How many drinks did you have on a typical day when you were drinking in the past year? 0  3) How often did you have 6 or more drinks on one occasion in the past year? never    AUDIT-C Score: 1  Interpretation: Score 0-3 (male): Negative screen for alcohol misuse    Single Item Drug Screening:  How often have you used an illegal drug (including marijuana) or a prescription medication for non-medical reasons in the past year? never    Single Item Drug Screen Score: 0  Interpretation: Negative screen for possible drug use disorder    No  results found.     Physical Exam:     There were no vitals taken for this visit.    Physical Exam     Vishnu Jones MD

## 2024-05-23 NOTE — PATIENT INSTRUCTIONS
Medicare Preventive Visit Patient Instructions  Thank you for completing your Welcome to Medicare Visit or Medicare Annual Wellness Visit today. Your next wellness visit will be due in one year (5/24/2025).  The screening/preventive services that you may require over the next 5-10 years are detailed below. Some tests may not apply to you based off risk factors and/or age. Screening tests ordered at today's visit but not completed yet may show as past due. Also, please note that scanned in results may not display below.  Preventive Screenings:  Service Recommendations Previous Testing/Comments   Colorectal Cancer Screening  Colonoscopy    Fecal Occult Blood Test (FOBT)/Fecal Immunochemical Test (FIT)  Fecal DNA/Cologuard Test  Flexible Sigmoidoscopy Age: 45-75 years old   Colonoscopy: every 10 years (May be performed more frequently if at higher risk)  OR  FOBT/FIT: every 1 year  OR  Cologuard: every 3 years  OR  Sigmoidoscopy: every 5 years  Screening may be recommended earlier than age 45 if at higher risk for colorectal cancer. Also, an individualized decision between you and your healthcare provider will decide whether screening between the ages of 76-85 would be appropriate. Colonoscopy: 09/23/2021  FOBT/FIT: Not on file  Cologuard: Not on file  Sigmoidoscopy: Not on file    Screening Current     Prostate Cancer Screening Individualized decision between patient and health care provider in men between ages of 55-69   Medicare will cover every 12 months beginning on the day after your 50th birthday PSA: No results in last 5 years     Screening Not Indicated  Screening Not Indicated     Hepatitis C Screening Once for adults born between 1945 and 1965  More frequently in patients at high risk for Hepatitis C Hep C Antibody: 10/07/2019    Screening Current  Screening Current   Diabetes Screening 1-2 times per year if you're at risk for diabetes or have pre-diabetes Fasting glucose: No results in last 5 years (No  results in last 5 years)  A1C: No results in last 5 years (No results in last 5 years)  Screening Current   Cholesterol Screening Once every 5 years if you don't have a lipid disorder. May order more often based on risk factors. Lipid panel: 10/07/2019  Screening Not Indicated  History Lipid Disorder  Screening Not Indicated  History Lipid Disorder      Other Preventive Screenings Covered by Medicare:  Abdominal Aortic Aneurysm (AAA) Screening: covered once if your at risk. You're considered to be at risk if you have a family history of AAA or a male between the age of 65-75 who smoking at least 100 cigarettes in your lifetime.  Lung Cancer Screening: covers low dose CT scan once per year if you meet all of the following conditions: (1) Age 55-77; (2) No signs or symptoms of lung cancer; (3) Current smoker or have quit smoking within the last 15 years; (4) You have a tobacco smoking history of at least 20 pack years (packs per day x number of years you smoked); (5) You get a written order from a healthcare provider.  Glaucoma Screening: covered annually if you're considered high risk: (1) You have diabetes OR (2) Family history of glaucoma OR (3)  aged 50 and older OR (4)  American aged 65 and older  Osteoporosis Screening: covered every 2 years if you meet one of the following conditions: (1) Have a vertebral abnormality; (2) On glucocorticoid therapy for more than 3 months; (3) Have primary hyperparathyroidism; (4) On osteoporosis medications and need to assess response to drug therapy.  HIV Screening: covered annually if you're between the age of 15-65. Also covered annually if you are younger than 15 and older than 65 with risk factors for HIV infection. For pregnant patients, it is covered up to 3 times per pregnancy.    Immunizations:  Immunization Recommendations   Influenza Vaccine Annual influenza vaccination during flu season is recommended for all persons aged >= 6 months who do  not have contraindications   Pneumococcal Vaccine   * Pneumococcal conjugate vaccine = PCV13 (Prevnar 13), PCV15 (Vaxneuvance), PCV20 (Prevnar 20)  * Pneumococcal polysaccharide vaccine = PPSV23 (Pneumovax) Adults 19-63 yo with certain risk factors or if 65+ yo  If never received any pneumonia vaccine: recommend Prevnar 20 (PCV20)  Give PCV20 if previously received 1 dose of PCV13 or PPSV23   Hepatitis B Vaccine 3 dose series if at intermediate or high risk (ex: diabetes, end stage renal disease, liver disease)   Respiratory syncytial virus (RSV) Vaccine - COVERED BY MEDICARE PART D  * RSVPreF3 (Arexvy) CDC recommends that adults 60 years of age and older may receive a single dose of RSV vaccine using shared clinical decision-making (SCDM)   Tetanus (Td) Vaccine - COST NOT COVERED BY MEDICARE PART B Following completion of primary series, a booster dose should be given every 10 years to maintain immunity against tetanus. Td may also be given as tetanus wound prophylaxis.   Tdap Vaccine - COST NOT COVERED BY MEDICARE PART B Recommended at least once for all adults. For pregnant patients, recommended with each pregnancy.   Shingles Vaccine (Shingrix) - COST NOT COVERED BY MEDICARE PART B  2 shot series recommended in those 19 years and older who have or will have weakened immune systems or those 50 years and older     Health Maintenance Due:      Topic Date Due   • Hepatitis C Screening  Completed   • Colorectal Cancer Screening  Discontinued     Immunizations Due:      Topic Date Due   • COVID-19 Vaccine (4 - 2023-24 season) 09/01/2023     Advance Directives   What are advance directives?  Advance directives are legal documents that state your wishes and plans for medical care. These plans are made ahead of time in case you lose your ability to make decisions for yourself. Advance directives can apply to any medical decision, such as the treatments you want, and if you want to donate organs.   What are the types of  advance directives?  There are many types of advance directives, and each state has rules about how to use them. You may choose a combination of any of the following:  Living will:  This is a written record of the treatment you want. You can also choose which treatments you do not want, which to limit, and which to stop at a certain time. This includes surgery, medicine, IV fluid, and tube feedings.   Durable power of  for healthcare (DPAHC):  This is a written record that states who you want to make healthcare choices for you when you are unable to make them for yourself. This person, called a proxy, is usually a family member or a friend. You may choose more than 1 proxy.  Do not resuscitate (DNR) order:  A DNR order is used in case your heart stops beating or you stop breathing. It is a request not to have certain forms of treatment, such as CPR. A DNR order may be included in other types of advance directives.  Medical directive:  This covers the care that you want if you are in a coma, near death, or unable to make decisions for yourself. You can list the treatments you want for each condition. Treatment may include pain medicine, surgery, blood transfusions, dialysis, IV or tube feedings, and a ventilator (breathing machine).  Values history:  This document has questions about your views, beliefs, and how you feel and think about life. This information can help others choose the care that you would choose.  Why are advance directives important?  An advance directive helps you control your care. Although spoken wishes may be used, it is better to have your wishes written down. Spoken wishes can be misunderstood, or not followed. Treatments may be given even if you do not want them. An advance directive may make it easier for your family to make difficult choices about your care.   Weight Management   Why it is important to manage your weight:  Being overweight increases your risk of health conditions  such as heart disease, high blood pressure, type 2 diabetes, and certain types of cancer. It can also increase your risk for osteoarthritis, sleep apnea, and other respiratory problems. Aim for a slow, steady weight loss. Even a small amount of weight loss can lower your risk of health problems.  How to lose weight safely:  A safe and healthy way to lose weight is to eat fewer calories and get regular exercise. You can lose up about 1 pound a week by decreasing the number of calories you eat by 500 calories each day.   Healthy meal plan for weight management:  A healthy meal plan includes a variety of foods, contains fewer calories, and helps you stay healthy. A healthy meal plan includes the following:  Eat whole-grain foods more often.  A healthy meal plan should contain fiber. Fiber is the part of grains, fruits, and vegetables that is not broken down by your body. Whole-grain foods are healthy and provide extra fiber in your diet. Some examples of whole-grain foods are whole-wheat breads and pastas, oatmeal, brown rice, and bulgur.  Eat a variety of vegetables every day.  Include dark, leafy greens such as spinach, kale, salbador greens, and mustard greens. Eat yellow and orange vegetables such as carrots, sweet potatoes, and winter squash.   Eat a variety of fruits every day.  Choose fresh or canned fruit (canned in its own juice or light syrup) instead of juice. Fruit juice has very little or no fiber.  Eat low-fat dairy foods.  Drink fat-free (skim) milk or 1% milk. Eat fat-free yogurt and low-fat cottage cheese. Try low-fat cheeses such as mozzarella and other reduced-fat cheeses.  Choose meat and other protein foods that are low in fat.  Choose beans or other legumes such as split peas or lentils. Choose fish, skinless poultry (chicken or turkey), or lean cuts of red meat (beef or pork). Before you cook meat or poultry, cut off any visible fat.   Use less fat and oil.  Try baking foods instead of frying  them. Add less fat, such as margarine, sour cream, regular salad dressing and mayonnaise to foods. Eat fewer high-fat foods. Some examples of high-fat foods include french fries, doughnuts, ice cream, and cakes.  Eat fewer sweets.  Limit foods and drinks that are high in sugar. This includes candy, cookies, regular soda, and sweetened drinks.  Exercise:  Exercise at least 30 minutes per day on most days of the week. Some examples of exercise include walking, biking, dancing, and swimming. You can also fit in more physical activity by taking the stairs instead of the elevator or parking farther away from stores. Ask your healthcare provider about the best exercise plan for you.      © Copyright [employer] 2018 Information is for End User's use only and may not be sold, redistributed or otherwise used for commercial purposes. All illustrations and images included in CareNotes® are the copyrighted property of A.D.A.M., Inc. or [employer]

## 2024-05-23 NOTE — PROGRESS NOTES
"Assessment/Plan:    No problem-specific Assessment & Plan notes found for this encounter.             Subjective:      Patient ID: Neftali Scott is a 78 y.o. male.      PATIENT RETURNS FOR FOLLOW-UP OF CHRONIC MEDICAL CONDITIONS.  ANY HOSPITAL VISITS, EMERGENCY VISITS AND OTHER PROVIDER VISITS SINCE LAST TIME WERE REVIEWED.  MEDS WERE REVIEWED AND NO SIDE EFFECTS.  NO NEW ISSUES  UNLESS NOTED BELOW. NO NEW MEDICAL PROVIDER REPORTED. THE CHRONIC DISEASES LISTED ABOVE ARE STABLE AND UNCHANGED/ THE PLAN OF CARE FOR THOSE WILL REMAIN UNCHANGED UNLESS NOTED BELOW.      Awv/sub     Interval dx CLL ; Urolift placed by [facility] 2/24.             The following portions of the patient's history were reviewed and updated as appropriate: allergies, current medications, past family history, past medical history, past social history, past surgical history and problem list.    Review of Systems   Constitutional:  Negative for activity change and appetite change.   HENT:  Negative for trouble swallowing.    Eyes:  Negative for visual disturbance.   Respiratory:  Negative for cough and shortness of breath.    Cardiovascular:  Negative for chest pain, palpitations and leg swelling.   Gastrointestinal:  Negative for abdominal pain and blood in stool.   Endocrine: Negative for polyuria.   Genitourinary:  Negative for difficulty urinating and hematuria.   Skin:  Negative for rash.   Neurological:  Negative for dizziness.   Psychiatric/Behavioral:  Negative for behavioral problems.          Objective:  Vitals:    05/23/24 0949   BP: 124/62   BP Location: Left arm   Patient Position: Sitting   Cuff Size: Adult   Pulse: 70   Temp: 98 °F (36.7 °C)   TempSrc: Temporal   SpO2: 98%   Weight: 95.3 kg (210 lb)   Height: 5' 10\" (1.778 m)      Physical Exam  Constitutional:       Appearance: Normal appearance.   HENT:      Head: Normocephalic and atraumatic.      Right Ear: Tympanic membrane and ear canal normal.      Left Ear: Tympanic membrane and " "ear canal normal.      Mouth/Throat:      Mouth: Mucous membranes are moist.      Pharynx: Oropharynx is clear.   Eyes:      Conjunctiva/sclera: Conjunctivae normal.   Cardiovascular:      Rate and Rhythm: Normal rate and regular rhythm.      Pulses: Normal pulses.      Heart sounds: Normal heart sounds. No murmur heard.  Pulmonary:      Effort: Pulmonary effort is normal. No respiratory distress.      Breath sounds: Normal breath sounds.   Musculoskeletal:      Cervical back: Neck supple.   Lymphadenopathy:      Cervical: No cervical adenopathy.   Neurological:      Mental Status: He is alert. Mental status is at baseline.   Psychiatric:         Mood and Affect: Mood normal.         Thought Content: Thought content normal.           Patient's chronic problems that were reviewed today are stable. Recent hospital stays reviewed. Recent labs and imaging reviewed. Recent visits to other providers reviewed. Meds reviewed and no changes made. Appropriate labs and imaging were ordered. Preventive measures appropriate for age and sex were reviewed with patient. Immunizations were updated as appropriate.     Answers submitted by the patient for this visit:  Medicare Annual Wellness Visit (Submitted on 5/22/2024)  How would you rate your overall health?: good  Compared to last year, how is your physical health?: same  In general, how satisfied are you with your life?: satisfied  Compared to last year, how is your eyesight?: same  Compared to last year, how is your hearing?: slightly worse  Compared to last year, how is your emotional/mental health?: same  How often is anger a problem for you?: sometimes  How often do you feel unusually tired/fatigued?: often  In the past 7 days, how much pain have you experienced?: some  If you answered \"some\" or \"a lot\", please rate the severity of your pain on a scale of 1 to 10 (1 being the least severe pain and 10 being the most intense pain).: 3/10  In the past 6 months, have you lost " or gained 10 pounds without trying?: No  One or more falls in the last year: No  Do you have trouble with the stairs inside or outside your home?: No  Does your home have working smoke alarms?: Yes  Does your home have a carbon monoxide monitor?: No  Which safety hazards (if any) have you experienced in your home? Please select all that apply.: none  How would you describe your current diet? Please select all that apply.: Regular  In addition to prescription medications, are you taking any over-the-counter supplements?: Yes  If yes, what supplements are you taking?: vitamins and probiotic  Can you manage your medications?: Yes  Are you currently taking any opioid medications?: No  Can you walk and transfer into and out of your bed and chair?: Yes  Can you dress and groom yourself?: Yes  Can you bathe or shower yourself?: Yes  Can you feed yourself?: Yes  Can you do your laundry/ housekeeping?: Yes  Can you manage your money, pay your bills, and track your expenses?: Yes  Can you make your own meals?: Yes  Can you do your own shopping?: Yes  Please list your DME (Durable Medical Equipment) supplier, if you use one.: [employer]  Within the last 12 months, have you had any hospitalizations or Emergency Department visits?: No  Do you have a living will?: Yes  Do you have a Durable POA (Power of ) for healthcare decisions?: Yes  Do you have an Advanced Directive for end of life decisions?: Yes  How often have you used an illegal drug (including marijuana) or a prescription medication for non-medical reasons in the past year?: never  What is the typical number of drinks you consume in a day?: 0  What is the typical number of drinks you consume in a week?: 0  How often did you have a drink containing alcohol in the past year?: monthly or less  How many drinks did you have on a typical day  when you were drinking in the past year?: 0  How often did you have 6 or more drinks on one occasion in the past year?:  never

## 2024-05-23 NOTE — PROGRESS NOTES
Ambulatory Visit  Name: Neftali Scott      : 1945      MRN: [id number]  Encounter Provider: Vishnu Jones MD  Encounter Date: 2024   Encounter department: [facility]    Assessment & Plan   1. Low HDL (under 40)  2. Long term systemic steroid user  Assessment & Plan:  Still no steroid use recently.   3. Other hyperlipidemia  4. PMR (polymyalgia rheumatica) ([facility])  Assessment & Plan:  No meds currently .  5. Benign prostatic hyperplasia with lower urinary tract symptoms, symptom details unspecified  6. Rheumatoid arthritis involving multiple sites, unspecified whether rheumatoid factor present ([facility])  Assessment & Plan:  In remission: no meds.   7. Lumbar radiculopathy  8. CLL (chronic lymphocytic leukemia) ([facility])       Preventive health issues were discussed with patient, and age appropriate screening tests were ordered as noted in patient's After Visit Summary. Personalized health advice and appropriate referrals for health education or preventive services given if needed, as noted in patient's After Visit Summary.    History of Present Illness     HPI   Patient Care Team:  Vishnu Jones MD as PCP - General    Review of Systems  Medical History Reviewed by provider this encounter:  Meds  Problems       Annual Wellness Visit Questionnaire   Neftali is here for his Subsequent Wellness visit. Last Medicare Wellness visit information reviewed, patient interviewed, no change since last AWV.     Health Risk Assessment:   Patient rates overall health as good. Patient feels that their physical health rating is same. Patient is satisfied with their life. Eyesight was rated as same. Hearing was rated as slightly worse. Patient feels that their emotional and mental health rating is same. Patients states they are sometimes angry. Patient states they are often unusually tired/fatigued. Pain experienced in the last 7 days has been some. Patient's pain rating has been 3/10.  Patient states that he has experienced no weight loss or gain in last 6 months.     Depression Screening:   PHQ-2 Score: 2      Fall Risk Screening:   In the past year, patient has experienced: no history of falling in past year      Home Safety:  Patient does not have trouble with stairs inside or outside of their home. Patient has working smoke alarms and has no working carbon monoxide detector. Home safety hazards include: none.     Nutrition:   Current diet is Regular.     Medications:   Patient is currently taking over-the-counter supplements. OTC medications include: see medication list. Patient is able to manage medications.     Activities of Daily Living (ADLs)/Instrumental Activities of Daily Living (IADLs):   Walk and transfer into and out of bed and chair?: Yes  Dress and groom yourself?: Yes    Bathe or shower yourself?: Yes    Feed yourself? Yes  Do your laundry/housekeeping?: Yes  Manage your money, pay your bills and track your expenses?: Yes  Make your own meals?: Yes    Do your own shopping?: Yes    Durable Medical Equipment Suppliers  [employer]    Previous Hospitalizations:   Any hospitalizations or ED visits within the last 12 months?: No      Hospitalization Comments: Dental eyes: counseled    Advance Care Planning:   Living will: Yes    Durable POA for healthcare: Yes    Advanced directive: Yes      Cognitive Screening:   Provider or family/friend/caregiver concerned regarding cognition?: No    PREVENTIVE SCREENINGS      Cardiovascular Screening:    General: Screening Not Indicated and History Lipid Disorder      Diabetes Screening:     General: Screening Current      Colorectal Cancer Screening:     General: Screening Current      Prostate Cancer Screening:    General: Screening Not Indicated      Osteoporosis Screening:    General: Screening Not Indicated      Abdominal Aortic Aneurysm (AAA) Screening:    Risk factors include: tobacco use        Lung Cancer Screening:     General:  "Screening Not Indicated      Hepatitis C Screening:    General: Screening Current    Screening, Brief Intervention, and Referral to Treatment (SBIRT)    Screening  Typical number of drinks in a day: 0  Typical number of drinks in a week: 0  Interpretation: Low risk drinking behavior.    AUDIT-C Screenin) How often did you have a drink containing alcohol in the past year? monthly or less  2) How many drinks did you have on a typical day when you were drinking in the past year? 0  3) How often did you have 6 or more drinks on one occasion in the past year? never    AUDIT-C Score: 1  Interpretation: Score 0-3 (male): Negative screen for alcohol misuse    Single Item Drug Screening:  How often have you used an illegal drug (including marijuana) or a prescription medication for non-medical reasons in the past year? never    Single Item Drug Screen Score: 0  Interpretation: Negative screen for possible drug use disorder    Social Determinants of Health     Food Insecurity: No Food Insecurity (2024)    Hunger Vital Sign     Worried About Running Out of Food in the Last Year: Never true     Ran Out of Food in the Last Year: Never true   Transportation Needs: No Transportation Needs (2024)    PRAPARE - Transportation     Lack of Transportation (Medical): No     Lack of Transportation (Non-Medical): No   Housing Stability: Low Risk  (2024)    Housing Stability Vital Sign     Unable to Pay for Housing in the Last Year: No     Number of Times Moved in the Last Year: 0     Homeless in the Last Year: No   Utilities: Not At Risk (2024)    [facility] Utilities     Threatened with loss of utilities: No     No results found.    Objective     /62 (BP Location: Left arm, Patient Position: Sitting, Cuff Size: Adult)   Pulse 70   Temp 98 °F (36.7 °C) (Temporal)   Ht 5' 10\" (1.778 m)   Wt 95.3 kg (210 lb)   SpO2 98%   BMI 30.13 kg/m²     Physical Exam  Administrative Statements           "

## 2024-06-12 ENCOUNTER — HOSPITAL ENCOUNTER (OUTPATIENT)
Dept: BONE DENSITY | Facility: HOSPITAL | Age: 79
Discharge: HOME | End: 2024-06-12
Payer: COMMERCIAL

## 2024-06-12 VITALS — HEIGHT: 70 IN | WEIGHT: 205 LBS | BODY MASS INDEX: 29.35 KG/M2

## 2024-06-12 DIAGNOSIS — Z79.52 LONG TERM SYSTEMIC STEROID USER: ICD-10-CM

## 2024-06-12 PROCEDURE — 77080 DXA BONE DENSITY AXIAL: CPT

## 2024-06-16 DIAGNOSIS — M85.80 OSTEOPENIA, UNSPECIFIED LOCATION: Primary | ICD-10-CM

## 2024-06-16 RX ORDER — CAL/D3/MAG11/ZINC/COP/MANG/BOR 600 MG-800
1 TABLET ORAL 2 TIMES DAILY
Start: 2024-06-16

## 2024-06-17 ENCOUNTER — TELEPHONE (OUTPATIENT)
Dept: FAMILY MEDICINE CLINIC | Facility: CLINIC | Age: 79
End: 2024-06-17

## 2024-06-17 NOTE — TELEPHONE ENCOUNTER
----- Message from Vishnu Jones MD sent at 6/16/2024  4:03 PM EDT -----  Bone scan = mild bone loss: I recommned he get Vit D level soon (ordered) and start Caltrate D : 600/800 :  one daliy for 1 mos then one bid to prevent further bone loss ; repeat the study in 2-3 yrs  ----- Message -----  From: Interface, Radiology Results In  Sent: 6/14/2024   6:45 AM EDT  To: Vishnu Jones MD

## 2024-06-21 ENCOUNTER — APPOINTMENT (OUTPATIENT)
Dept: LAB | Age: 79
End: 2024-06-21
Payer: COMMERCIAL

## 2024-06-21 DIAGNOSIS — M85.80 OSTEOPENIA, UNSPECIFIED LOCATION: ICD-10-CM

## 2024-06-21 LAB — 25(OH)D3 SERPL-MCNC: 47.7 NG/ML (ref 30–100)

## 2024-06-21 PROCEDURE — 82306 VITAMIN D 25 HYDROXY: CPT

## 2024-06-21 PROCEDURE — 36415 COLL VENOUS BLD VENIPUNCTURE: CPT

## 2024-07-10 ENCOUNTER — OFFICE VISIT (OUTPATIENT)
Dept: URGENT CARE | Facility: CLINIC | Age: 79
End: 2024-07-10
Payer: COMMERCIAL

## 2024-07-10 VITALS
TEMPERATURE: 97.8 F | RESPIRATION RATE: 16 BRPM | HEART RATE: 73 BPM | WEIGHT: 210 LBS | BODY MASS INDEX: 30.13 KG/M2 | SYSTOLIC BLOOD PRESSURE: 150 MMHG | DIASTOLIC BLOOD PRESSURE: 80 MMHG | OXYGEN SATURATION: 97 %

## 2024-07-10 DIAGNOSIS — B00.1 COLD SORE: Primary | ICD-10-CM

## 2024-07-10 PROCEDURE — 99204 OFFICE O/P NEW MOD 45 MIN: CPT | Performed by: PHYSICIAN ASSISTANT

## 2024-07-10 PROCEDURE — S9083 URGENT CARE CENTER GLOBAL: HCPCS | Performed by: PHYSICIAN ASSISTANT

## 2024-07-10 RX ORDER — VALACYCLOVIR HYDROCHLORIDE 1 G/1
1000 TABLET, FILM COATED ORAL 2 TIMES DAILY
Qty: 14 TABLET | Refills: 0 | Status: SHIPPED | OUTPATIENT
Start: 2024-07-10 | End: 2024-07-17

## 2024-07-10 NOTE — PROGRESS NOTES
[facility]        NAME: Neftali Scott is a 78 y.o. male  : 1945    MRN: [id number]  DATE: July 10, 2024  TIME: 1:12 PM    Assessment and Plan   Cold sore [B00.1]  1. Cold sore  valACYclovir (VALTREX) 1,000 mg tablet            Patient Instructions     Patient Instructions   Recommended treatment with valtrex. Patient advised to keep his mouth clean and dry.     Follow up with PCP in 3-5 days.  Proceed to  ER if symptoms worsen.    If tests are performed, our office will contact you with results only if changes need to made to the care plan discussed with you at the visit. You can review your full results on [facility].        Chief Complaint     Chief Complaint   Patient presents with    Mouth Lesions     Pt c/o cold sore in corner of mouth on right side for 3 weeks not healing, Pt using alcohol swab to dry it out. Pt states he has CA and sleep apnea and the mask keeps it moist.          History of Present Illness       Mouth Lesions   The current episode started more than 2 weeks ago. The onset was sudden. The problem has been unchanged. The problem is moderate. Nothing relieves the symptoms. Exacerbated by: Sleep apnea machine at night time. Associated symptoms include mouth sores. Pertinent negatives include no fever, no congestion, no rhinorrhea and no swollen glands.       Review of Systems   Review of Systems   Constitutional:  Negative for activity change, appetite change, chills, diaphoresis, fatigue and fever.   HENT:  Positive for mouth sores. Negative for congestion and rhinorrhea.    All other systems reviewed and are negative.        Current Medications       Current Outpatient Medications:     valACYclovir (VALTREX) 1,000 mg tablet, Take 1 tablet (1,000 mg total) by mouth 2 (two) times a day for 7 days, Disp: 14 tablet, Rfl: 0    zolpidem (AMBIEN) 10 mg tablet, Take 1 tablet (10 mg total) by mouth daily at bedtime as needed for sleep, Disp: 90 tablet, Rfl: 3    Calcium  Carbonate-Vit D-Min (Caltrate 600+D Plus Minerals) 600-800 MG-UNIT TABS, Take 1 tablet by mouth 2 (two) times a day (Patient not taking: Reported on 7/10/2024), Disp: , Rfl:     Current Allergies     Allergies as of 07/10/2024 - Reviewed 07/10/2024   Allergen Reaction Noted    Bee venom Anaphylaxis 08/29/2016    Other Rash 07/21/1996    Codeine Other (See Comments) and Hallucinations 10/15/1999            The following portions of the patient's history were reviewed and updated as appropriate: allergies, current medications, past family history, past medical history, past social history, past surgical history and problem list.     Past Medical History:   Diagnosis Date    Appendicitis     AGE 35    Cancer (HCC)     leukemia    Depression     Deviated nasal septum 03/1996    Head trauma 11/2003    DUE TO FALL; HOSPITALIZATION    Heart murmur     ONSET AT AGE 23    Herniated disc, cervical 06/1996       Past Surgical History:   Procedure Laterality Date    APPENDECTOMY      AGE 35    BACK SURGERY      CHOLECYSTECTOMY      CLAVICLE SURGERY Left     HARDWARE IN    DEBRIDEMENT TENNIS ELBOW Right     R TENNIS ELBOW SX    LUMBAR DISC SURGERY  06/1996    HERNIATED DISC; X2    PROSTATE SURGERY  02/2024    RHINOPLASTY  03/1996    DEDVIATED NASAL SEPTUM    UVULOPALATOPHARYNGOPLASTY      LISTED 2 X; RESOLVED WITH SURGERY       Family History   Problem Relation Age of Onset    Heart failure Father     Brain cancer Sister          Medications have been verified.        Objective   /80   Pulse 73   Temp 97.8 °F (36.6 °C)   Resp 16   Wt 95.3 kg (210 lb)   SpO2 97%   BMI 30.13 kg/m²        Physical Exam     Physical Exam  Vitals and nursing note reviewed.   Constitutional:       Appearance: Normal appearance.   HENT:      Mouth/Throat:      Comments: Cold sore palpable at the right lateral [place] border. No other lesions inside the mouth.   Skin:     General: Skin is warm and dry.      Capillary Refill: Capillary  refill takes less than 2 seconds.   Neurological:      General: No focal deficit present.      Mental Status: He is alert and oriented to person, place, and time.   Psychiatric:         Mood and Affect: Mood normal.         Behavior: Behavior normal.

## 2024-07-10 NOTE — PATIENT INSTRUCTIONS
Recommended treatment with valtrex. Patient advised to keep his mouth clean and dry.     Follow up with PCP in 3-5 days.  Proceed to  ER if symptoms worsen.    If tests are performed, our office will contact you with results only if changes need to made to the care plan discussed with you at the visit. You can review your full results on [facility].

## 2024-07-16 ENCOUNTER — OFFICE VISIT (OUTPATIENT)
Dept: URGENT CARE | Facility: CLINIC | Age: 79
End: 2024-07-16
Payer: COMMERCIAL

## 2024-07-16 VITALS
OXYGEN SATURATION: 98 % | DIASTOLIC BLOOD PRESSURE: 80 MMHG | TEMPERATURE: 97.8 F | HEART RATE: 54 BPM | RESPIRATION RATE: 18 BRPM | SYSTOLIC BLOOD PRESSURE: 156 MMHG

## 2024-07-16 DIAGNOSIS — Z85.6 PERSONAL HISTORY OF CLL (CHRONIC LYMPHOCYTIC LEUKEMIA): ICD-10-CM

## 2024-07-16 DIAGNOSIS — B00.1 COLD SORE: Primary | ICD-10-CM

## 2024-07-16 PROCEDURE — S9083 URGENT CARE CENTER GLOBAL: HCPCS | Performed by: EMERGENCY MEDICINE

## 2024-07-16 PROCEDURE — 99213 OFFICE O/P EST LOW 20 MIN: CPT | Performed by: EMERGENCY MEDICINE

## 2024-07-16 RX ORDER — ACYCLOVIR 50 MG/G
OINTMENT TOPICAL 3 TIMES DAILY
Qty: 30 G | Refills: 1 | Status: SHIPPED | OUTPATIENT
Start: 2024-07-16 | End: 2024-07-23

## 2024-07-16 NOTE — PATIENT INSTRUCTIONS
Apply ointment especially before putting on your CPAP  Finish your Valtrex  F/u with PCP or ENT or dentist/oral maxillary Dr. if no improvement  After ointment is gone, you can even apply a little vaseline to the corners of your lips

## 2024-07-16 NOTE — PROGRESS NOTES
[facility]        NAME: Neftali Scott is a 78 y.o. male  : 1945    MRN: [id number]  DATE: 2024  TIME: 9:09 AM    Assessment and Plan   Cold sore [B00.1]  1. Cold sore  acyclovir (ZOVIRAX) 5 % ointment    Ambulatory Referral to Otolaryngology      2. Personal history of CLL (chronic lymphocytic leukemia)  acyclovir (ZOVIRAX) 5 % ointment    Ambulatory Referral to Otolaryngology            Patient Instructions     Patient Instructions    Apply ointment especially before putting on your CPAP  Finish your Valtrex  F/u with PCP or ENT or dentist/oral maxillary Dr. if no improvement  After ointment is gone, you can even apply a little vaseline to the corners of your lips        Follow up with PCP in 3-5 days.  Proceed to  ER if symptoms worsen.    Chief Complaint     Chief Complaint   Patient presents with   • Mouth Lesions     Pt c/o cold sore that started 3/4 weeks ago and was here last week and gave medicine it worked and then started coming back again         History of Present Illness       78-year-old white male with a chief complaint of a sore on his lips.  Patient states he was seen here about a week ago and placed on some medication and it seemed to get a little better but now it is getting worse.  Patient does wear a CPAP mask at night and has a history of chronic lymphocytic leukemia.        Review of Systems   Review of Systems   Constitutional:  Negative for chills and fever.   HENT:  Positive for mouth sores. Negative for congestion and rhinorrhea.    Eyes:  Negative for discharge and visual disturbance.   Respiratory:  Negative for shortness of breath and wheezing.    Cardiovascular:  Negative for chest pain and palpitations.   Gastrointestinal:  Negative for abdominal pain and vomiting.   Endocrine: Negative for polydipsia and polyuria.   Genitourinary:  Negative for dysuria and hematuria.   Musculoskeletal:  Negative for arthralgias, gait problem and neck stiffness.   Skin:   Negative for rash and wound.   Neurological:  Negative for dizziness and headaches.   Psychiatric/Behavioral:  Negative for confusion and suicidal ideas.          Current Medications       Current Outpatient Medications:   •  acyclovir (ZOVIRAX) 5 % ointment, Apply topically 3 (three) times a day for 7 days, Disp: 30 g, Rfl: 1  •  valACYclovir (VALTREX) 1,000 mg tablet, Take 1 tablet (1,000 mg total) by mouth 2 (two) times a day for 7 days, Disp: 14 tablet, Rfl: 0  •  zolpidem (AMBIEN) 10 mg tablet, Take 1 tablet (10 mg total) by mouth daily at bedtime as needed for sleep, Disp: 90 tablet, Rfl: 3  •  Calcium Carbonate-Vit D-Min (Caltrate 600+D Plus Minerals) 600-800 MG-UNIT TABS, Take 1 tablet by mouth 2 (two) times a day (Patient not taking: Reported on 7/10/2024), Disp: , Rfl:     Current Allergies     Allergies as of 07/16/2024 - Reviewed 07/16/2024   Allergen Reaction Noted   • Bee venom Anaphylaxis 08/29/2016   • Other Rash 07/21/1996   • Codeine Other (See Comments) and Hallucinations 10/15/1999            The following portions of the patient's history were reviewed and updated as appropriate: allergies, current medications, past family history, past medical history, past social history, past surgical history and problem list.     Past Medical History:   Diagnosis Date   • Appendicitis     AGE 35   • Cancer (HCC)     leukemia   • Depression    • Deviated nasal septum 03/1996   • Head trauma 11/2003    DUE TO FALL; HOSPITALIZATION   • Heart murmur     ONSET AT AGE 23   • Herniated disc, cervical 06/1996       Past Surgical History:   Procedure Laterality Date   • APPENDECTOMY      AGE 35   • BACK SURGERY     • CHOLECYSTECTOMY     • CLAVICLE SURGERY Left     HARDWARE IN   • DEBRIDEMENT TENNIS ELBOW Right     R TENNIS ELBOW SX   • LUMBAR DISC SURGERY  06/1996    HERNIATED DISC; X2   • PROSTATE SURGERY  02/2024   • RHINOPLASTY  03/1996    DEDVIATED NASAL SEPTUM   • UVULOPALATOPHARYNGOPLASTY      LISTED 2 X;  RESOLVED WITH SURGERY       Family History   Problem Relation Age of Onset   • Heart failure Father    • Brain cancer Sister          Medications have been verified.        Objective   /80   Pulse (!) 54   Temp 97.8 °F (36.6 °C) (Tympanic)   Resp 18   SpO2 98%        Physical Exam     Physical Exam  Vitals and nursing note reviewed.   Constitutional:       Appearance: Normal appearance.      Comments: Very pleasant 78-year-old male sitting on exam table complaining of a sore on his mouth.   HENT:      Head: Normocephalic and atraumatic.      Nose: Nose normal.      Mouth/Throat:      Mouth: Mucous membranes are moist.      Comments: There is some area of a fissure in the corner of the lips that could be consistent with a healing cold sore.  There is no erythema around the wound.  There are no other lesions of the mouth.  Eyes:      Extraocular Movements: Extraocular movements intact.      Conjunctiva/sclera: Conjunctivae normal.   Cardiovascular:      Rate and Rhythm: Normal rate.   Pulmonary:      Effort: Pulmonary effort is normal.   Musculoskeletal:         General: Normal range of motion.      Cervical back: Neck supple.   Skin:     General: Skin is warm and dry.   Neurological:      General: No focal deficit present.      Mental Status: He is alert and oriented to person, place, and time.   Psychiatric:         Mood and Affect: Mood normal.

## 2024-07-28 ENCOUNTER — OFFICE VISIT (OUTPATIENT)
Dept: URGENT CARE | Facility: CLINIC | Age: 79
End: 2024-07-28
Payer: COMMERCIAL

## 2024-07-28 VITALS
RESPIRATION RATE: 16 BRPM | WEIGHT: 207 LBS | HEART RATE: 60 BPM | OXYGEN SATURATION: 97 % | BODY MASS INDEX: 29.7 KG/M2 | DIASTOLIC BLOOD PRESSURE: 80 MMHG | SYSTOLIC BLOOD PRESSURE: 165 MMHG | TEMPERATURE: 99 F

## 2024-07-28 DIAGNOSIS — U07.1 COVID-19: Primary | ICD-10-CM

## 2024-07-28 LAB
SARS-COV-2 AG UPPER RESP QL IA: POSITIVE
VALID CONTROL: ABNORMAL

## 2024-07-28 PROCEDURE — 87811 SARS-COV-2 COVID19 W/OPTIC: CPT | Performed by: PHYSICIAN ASSISTANT

## 2024-07-28 PROCEDURE — 99213 OFFICE O/P EST LOW 20 MIN: CPT | Performed by: PHYSICIAN ASSISTANT

## 2024-07-28 PROCEDURE — S9083 URGENT CARE CENTER GLOBAL: HCPCS | Performed by: PHYSICIAN ASSISTANT

## 2024-07-28 RX ORDER — NIRMATRELVIR AND RITONAVIR 300-100 MG
3 KIT ORAL 2 TIMES DAILY
Qty: 30 TABLET | Refills: 0 | Status: SHIPPED | OUTPATIENT
Start: 2024-07-28 | End: 2024-08-02

## 2024-07-28 RX ORDER — BENZONATATE 200 MG/1
200 CAPSULE ORAL 3 TIMES DAILY PRN
Qty: 20 CAPSULE | Refills: 0 | Status: SHIPPED | OUTPATIENT
Start: 2024-07-28

## 2024-07-28 NOTE — PATIENT INSTRUCTIONS
Additional recommendations from [facility]:    If you do not already own one, obtain a pulse oximeter and monitor oxygen levels periodically throughout the day.         - Monitor your oxygen while at rest and also while walking         -If your oxygen drops below 90% during either of these times, please seek evaluation in the emergency department  2.  Take 2000 IUs of vitamin D daily total  3.  Make sure to get up and walk as tolerated throughout the day at least several times per day  4.  With lying down lie prone (on your belly) as much as tolerated

## 2024-07-28 NOTE — PROGRESS NOTES
[facility]        NAME: Neftali Scott is a 78 y.o. male  : 1945    MRN: [id number]  DATE: 2024  TIME: 2:19 PM    Assessment and Plan   COVID-19 [U07.1]  1. COVID-19  Poct Covid 19 Rapid Antigen Test    nirmatrelvir & ritonavir (Paxlovid, 300/100,) tablet therapy pack    benzonatate (TESSALON) 200 MG capsule          Paxlovid prescribed, further guidance for care during COVID given as below    The patient and/or parent/legal guardian verbalized understanding of exam findings and   Treatment plan. We engaged in the shared decision-making process and treatment options were   discussed at length with the patient.  All questions, concerns and complaints were answered and   addressed to the patient's' and/or parent/legal guardians's satisfaction.    Patient Instructions     Patient Instructions       Additional recommendations from [facility]:    If you do not already own one, obtain a pulse oximeter and monitor oxygen levels periodically throughout the day.         - Monitor your oxygen while at rest and also while walking         -If your oxygen drops below 90% during either of these times, please seek evaluation in the emergency department  2.  Take 2000 IUs of vitamin D daily total  3.  Make sure to get up and walk as tolerated throughout the day at least several times per day  4.  With lying down lie prone (on your belly) as much as tolerated      Follow up with PCP in 3-5 days.  Proceed to  ER if symptoms worsen.    If tests are performed, our office will contact you with results only if   changes need to made to the care plan discussed with you at the visit.   You can review your full results on [facility].     Chief Complaint     Chief Complaint   Patient presents with   • Cold Like Symptoms     Pt c/o cough with sore throat and fatigue that started on friday         History of Present Illness       HPI  Patient presents with a cough and sore throat and fatigue started this  past Friday.  Denies any fevers or chills.  Denies any chest pain or shortness of breath.  Has not taken home COVID test.    Review of Systems   Review of Systems  All other related systems reviewed and are negative except as noted in HPI    Current Medications       Current Outpatient Medications:   •  benzonatate (TESSALON) 200 MG capsule, Take 1 capsule (200 mg total) by mouth 3 (three) times a day as needed for cough, Disp: 20 capsule, Rfl: 0  •  nirmatrelvir & ritonavir (Paxlovid, 300/100,) tablet therapy pack, Take 3 tablets by mouth 2 (two) times a day for 5 days Take 2 nirmatrelvir tablets + 1 ritonavir tablet together per dose, Disp: 30 tablet, Rfl: 0  •  acyclovir (ZOVIRAX) 5 % ointment, Apply topically 3 (three) times a day for 7 days, Disp: 30 g, Rfl: 1  •  Calcium Carbonate-Vit D-Min (Caltrate 600+D Plus Minerals) 600-800 MG-UNIT TABS, Take 1 tablet by mouth 2 (two) times a day (Patient not taking: Reported on 7/10/2024), Disp: , Rfl:   •  valACYclovir (VALTREX) 1,000 mg tablet, Take 1 tablet (1,000 mg total) by mouth 2 (two) times a day for 7 days, Disp: 14 tablet, Rfl: 0  •  zolpidem (AMBIEN) 10 mg tablet, Take 1 tablet (10 mg total) by mouth daily at bedtime as needed for sleep, Disp: 90 tablet, Rfl: 3    Current Allergies     Allergies as of 07/28/2024 - Reviewed 07/28/2024   Allergen Reaction Noted   • Bee venom Anaphylaxis 08/29/2016   • Other Rash 07/21/1996   • Codeine Other (See Comments) and Hallucinations 10/15/1999            The following portions of the patient's history were reviewed and updated as appropriate: allergies, current medications, past family history, past medical history, past social history, past surgical history and problem list.     Past Medical History:   Diagnosis Date   • Appendicitis     AGE 35   • Cancer (HCC)     leukemia   • Depression    • Deviated nasal septum 03/1996   • Head trauma 11/2003    DUE TO FALL; HOSPITALIZATION   • Heart murmur     ONSET AT AGE 23   •  "Herniated disc, cervical 06/1996       Past Surgical History:   Procedure Laterality Date   • APPENDECTOMY      AGE 35   • BACK SURGERY     • CHOLECYSTECTOMY     • CLAVICLE SURGERY Left     HARDWARE IN   • DEBRIDEMENT TENNIS ELBOW Right     R TENNIS ELBOW SX   • LUMBAR DISC SURGERY  06/1996    HERNIATED DISC; X2   • PROSTATE SURGERY  02/2024   • RHINOPLASTY  03/1996    DEDVIATED NASAL SEPTUM   • UVULOPALATOPHARYNGOPLASTY      LISTED 2 X; RESOLVED WITH SURGERY       Family History   Problem Relation Age of Onset   • Heart failure Father    • Brain cancer Sister          Medications have been verified.        Objective   /80   Pulse 60   Temp 99 °F (37.2 °C) (Temporal)   Resp 16   Wt 93.9 kg (207 lb)   SpO2 97%   BMI 29.70 kg/m²   No LMP for male patient.       Physical Exam     Physical Exam  Constitutional:       General: He is not in acute distress.     Appearance: He is well-developed.   HENT:      Head: Normocephalic and atraumatic.   Eyes:      General: No scleral icterus.     Conjunctiva/sclera: Conjunctivae normal.   Cardiovascular:      Rate and Rhythm: Normal rate and regular rhythm.      Heart sounds: Normal heart sounds. No murmur heard.  Pulmonary:      Effort: Pulmonary effort is normal. No respiratory distress.      Breath sounds: No stridor. No wheezing, rhonchi or rales.   Musculoskeletal:      Cervical back: Normal range of motion and neck supple.   Skin:     General: Skin is warm and dry.   Neurological:      Mental Status: He is alert and oriented to person, place, and time.   Psychiatric:         Behavior: Behavior normal.         Ortho Exam        Procedures  No Procedures performed today        Note: Portions of this record may have been created with voice recognition software. Occasional wrong word or \"sound a like\" substitutions may have occurred due to the inherent limitations of voice recognition software. Please read the chart carefully and recognize, using context, where " substitutions have occurred.*

## 2024-07-29 ENCOUNTER — OFFICE VISIT (OUTPATIENT)
Dept: URGENT CARE | Facility: CLINIC | Age: 79
End: 2024-07-29
Payer: COMMERCIAL

## 2024-07-29 VITALS
WEIGHT: 205 LBS | OXYGEN SATURATION: 97 % | TEMPERATURE: 96.8 F | SYSTOLIC BLOOD PRESSURE: 138 MMHG | HEART RATE: 58 BPM | DIASTOLIC BLOOD PRESSURE: 68 MMHG | RESPIRATION RATE: 17 BRPM | BODY MASS INDEX: 29.41 KG/M2

## 2024-07-29 DIAGNOSIS — U07.1 COVID-19: ICD-10-CM

## 2024-07-29 DIAGNOSIS — H65.192 ACUTE MEE (MIDDLE EAR EFFUSION), LEFT: Primary | ICD-10-CM

## 2024-07-29 PROCEDURE — 99212 OFFICE O/P EST SF 10 MIN: CPT | Performed by: PHYSICIAN ASSISTANT

## 2024-07-29 PROCEDURE — S9083 URGENT CARE CENTER GLOBAL: HCPCS | Performed by: PHYSICIAN ASSISTANT

## 2024-07-29 RX ORDER — METHYLPREDNISOLONE 4 MG/1
TABLET ORAL
Qty: 21 TABLET | Refills: 0 | Status: SHIPPED | OUTPATIENT
Start: 2024-07-29

## 2024-07-29 NOTE — PROGRESS NOTES
[facility]        NAME: Neftali Scott is a 78 y.o. male  : 1945    MRN: [id number]  DATE: 2024  TIME: 3:55 PM    Assessment and Plan   Acute NIKOLAI (middle ear effusion), left [H65.192]  1. Acute NIKOLAI (middle ear effusion), left  methylPREDNISolone 4 MG tablet therapy pack      2. COVID-19              Patient Instructions     Patient Instructions   Recommended oral steroid.   Continue taking paxlovid as prescribed.     Follow up with PCP in 3-5 days.  Proceed to  ER if symptoms worsen.    If tests are performed, our office will contact you with results only if changes need to made to the care plan discussed with you at the visit. You can review your full results on [facility].        Chief Complaint     Chief Complaint   Patient presents with    COVID-19     Pt was dx with covid yesterday and since this morning is having a left sided earache. Pt states he feels pressure and hears like a drum thumping or motor running in ear.          History of Present Illness       Patient presents for evaluation of left ear pain. He states he was diagnosed with COVID yesterday and started the paxlovid and feels a lot better. But his left ear has been bothering him as he has been feeling fullness, pressure and pain.     Earache   There is pain in the left ear. This is a new problem. The current episode started yesterday. The problem occurs constantly. The problem has been unchanged. There has been no fever. Pertinent negatives include no ear discharge, headaches, rhinorrhea or sore throat.       Review of Systems   Review of Systems   HENT:  Positive for ear pain. Negative for ear discharge, rhinorrhea and sore throat.    Neurological:  Negative for headaches.   All other systems reviewed and are negative.        Current Medications       Current Outpatient Medications:     benzonatate (TESSALON) 200 MG capsule, Take 1 capsule (200 mg total) by mouth 3 (three) times a day as needed for cough,  Disp: 20 capsule, Rfl: 0    Calcium Carbonate-Vit D-Min (Caltrate 600+D Plus Minerals) 600-800 MG-UNIT TABS, Take 1 tablet by mouth 2 (two) times a day, Disp: , Rfl:     methylPREDNISolone 4 MG tablet therapy pack, Use as directed on package, Disp: 21 tablet, Rfl: 0    nirmatrelvir & ritonavir (Paxlovid, 300/100,) tablet therapy pack, Take 3 tablets by mouth 2 (two) times a day for 5 days Take 2 nirmatrelvir tablets + 1 ritonavir tablet together per dose, Disp: 30 tablet, Rfl: 0    zolpidem (AMBIEN) 10 mg tablet, Take 1 tablet (10 mg total) by mouth daily at bedtime as needed for sleep, Disp: 90 tablet, Rfl: 3    acyclovir (ZOVIRAX) 5 % ointment, Apply topically 3 (three) times a day for 7 days, Disp: 30 g, Rfl: 1    valACYclovir (VALTREX) 1,000 mg tablet, Take 1 tablet (1,000 mg total) by mouth 2 (two) times a day for 7 days, Disp: 14 tablet, Rfl: 0    Current Allergies     Allergies as of 07/29/2024 - Reviewed 07/29/2024   Allergen Reaction Noted    Bee venom Anaphylaxis 08/29/2016    Other Rash 07/21/1996    Codeine Other (See Comments) and Hallucinations 10/15/1999            The following portions of the patient's history were reviewed and updated as appropriate: allergies, current medications, past family history, past medical history, past social history, past surgical history and problem list.     Past Medical History:   Diagnosis Date    Appendicitis     AGE 35    Cancer (HCC)     leukemia    Depression     Deviated nasal septum 03/1996    Head trauma 11/2003    DUE TO FALL; HOSPITALIZATION    Heart murmur     ONSET AT AGE 23    Herniated disc, cervical 06/1996       Past Surgical History:   Procedure Laterality Date    APPENDECTOMY      AGE 35    BACK SURGERY      CHOLECYSTECTOMY      CLAVICLE SURGERY Left     HARDWARE IN    DEBRIDEMENT TENNIS ELBOW Right     R TENNIS ELBOW SX    LUMBAR DISC SURGERY  06/1996    HERNIATED DISC; X2    PROSTATE SURGERY  02/2024    RHINOPLASTY  03/1996    DEDVIATED NASAL  SEPTUM    UVULOPALATOPHARYNGOPLASTY      LISTED 2 X; RESOLVED WITH SURGERY       Family History   Problem Relation Age of Onset    Heart failure Father     Brain cancer Sister          Medications have been verified.        Objective   /68   Pulse 58   Temp (!) 96.8 °F (36 °C)   Resp 17   Wt 93 kg (205 lb)   SpO2 97%   BMI 29.41 kg/m²        Physical Exam     Physical Exam  Vitals and nursing note reviewed.   Constitutional:       Appearance: Normal appearance. He is not ill-appearing.   HENT:      Right Ear: Tympanic membrane, ear canal and external ear normal. Tenderness present. No drainage or swelling. Tympanic membrane is not erythematous or bulging.      Left Ear: Ear canal and external ear normal. Swelling present. No drainage or tenderness. A middle ear effusion is present. Tympanic membrane is bulging. Tympanic membrane is not erythematous.   Skin:     General: Skin is warm and dry.   Neurological:      General: No focal deficit present.      Mental Status: He is alert and oriented to person, place, and time.   Psychiatric:         Mood and Affect: Mood normal.         Behavior: Behavior normal.

## 2024-07-29 NOTE — PATIENT INSTRUCTIONS
Recommended oral steroid.   Continue taking paxlovid as prescribed.     Follow up with PCP in 3-5 days.  Proceed to  ER if symptoms worsen.    If tests are performed, our office will contact you with results only if changes need to made to the care plan discussed with you at the visit. You can review your full results on [facility].

## 2024-08-12 ENCOUNTER — APPOINTMENT (OUTPATIENT)
Dept: RADIOLOGY | Facility: CLINIC | Age: 79
End: 2024-08-12
Payer: COMMERCIAL

## 2024-08-12 ENCOUNTER — OFFICE VISIT (OUTPATIENT)
Dept: URGENT CARE | Facility: CLINIC | Age: 79
End: 2024-08-12
Payer: COMMERCIAL

## 2024-08-12 VITALS
TEMPERATURE: 98.6 F | RESPIRATION RATE: 18 BRPM | HEART RATE: 109 BPM | SYSTOLIC BLOOD PRESSURE: 139 MMHG | OXYGEN SATURATION: 96 % | DIASTOLIC BLOOD PRESSURE: 102 MMHG

## 2024-08-12 DIAGNOSIS — J01.41 ACUTE RECURRENT PANSINUSITIS: ICD-10-CM

## 2024-08-12 DIAGNOSIS — J06.9 VIRAL URI: ICD-10-CM

## 2024-08-12 DIAGNOSIS — R05.1 ACUTE COUGH: Primary | ICD-10-CM

## 2024-08-12 LAB
SARS-COV-2 AG UPPER RESP QL IA: NEGATIVE
VALID CONTROL: NORMAL

## 2024-08-12 PROCEDURE — 99214 OFFICE O/P EST MOD 30 MIN: CPT | Performed by: EMERGENCY MEDICINE

## 2024-08-12 PROCEDURE — 71046 X-RAY EXAM CHEST 2 VIEWS: CPT

## 2024-08-12 PROCEDURE — S9083 URGENT CARE CENTER GLOBAL: HCPCS | Performed by: EMERGENCY MEDICINE

## 2024-08-12 PROCEDURE — 87811 SARS-COV-2 COVID19 W/OPTIC: CPT | Performed by: EMERGENCY MEDICINE

## 2024-08-12 RX ORDER — FLUTICASONE PROPIONATE 50 MCG
2 SPRAY, SUSPENSION (ML) NASAL DAILY
Qty: 15.8 ML | Refills: 1 | Status: SHIPPED | OUTPATIENT
Start: 2024-08-12 | End: 2024-09-11

## 2024-08-12 RX ORDER — DOXYCYCLINE 100 MG/1
100 TABLET ORAL 2 TIMES DAILY
Qty: 14 TABLET | Refills: 0 | Status: SHIPPED | OUTPATIENT
Start: 2024-08-12 | End: 2024-08-19

## 2024-08-12 NOTE — PROGRESS NOTES
[facility]        NAME: Neftali Scott is a 79 y.o. male  : 1945    MRN: [id number]  DATE: 2024  TIME: 8:46 AM    Assessment and Plan   Acute cough [R05.1]  1. Acute cough  XR chest pa & lateral    Poct Covid 19 Rapid Antigen Test      2. Acute recurrent pansinusitis  doxycycline (ADOXA) 100 MG tablet    fluticasone (FLONASE) 50 mcg/act nasal spray            Patient Instructions       Follow up with PCP in 3-5 days.  Proceed to  ER if symptoms worsen.    If tests have been performed at [facility], our office will contact you with results if changes need to be made to the care plan discussed with you at the visit.  [facility]    Chief Complaint     Chief Complaint   Patient presents with    Sore Throat     Pt had covid 2 weeks ago and was with granddaughter who was sick on Tuesday and not sure with has covid again would like to be retested         History of Present Illness       Cough  This is a recurrent problem. The current episode started in the past 7 days. The problem has been gradually worsening. The problem occurs constantly. The cough is Productive of sputum. Associated symptoms include a fever, nasal congestion, postnasal drip, rhinorrhea and shortness of breath. Pertinent negatives include no chest pain, chills, ear congestion, ear pain, headaches, heartburn, hemoptysis, myalgias, rash, sore throat, sweats, weight loss or wheezing. The symptoms are aggravated by lying down. He has tried nothing for the symptoms.       Review of Systems   Review of Systems   Constitutional:  Positive for fever. Negative for activity change, appetite change, chills, diaphoresis, fatigue, unexpected weight change and weight loss.   HENT:  Positive for congestion, postnasal drip, rhinorrhea, sinus pressure and sinus pain. Negative for dental problem, drooling, ear discharge, ear pain, facial swelling, hearing loss, mouth sores, nosebleeds, sneezing,  sore throat, tinnitus, trouble swallowing and voice change.    Respiratory:  Positive for cough and shortness of breath. Negative for apnea, hemoptysis, choking, chest tightness, wheezing and stridor.    Cardiovascular:  Negative for chest pain.   Gastrointestinal:  Negative for heartburn.   Musculoskeletal:  Negative for myalgias.   Skin:  Negative for rash.   Neurological:  Negative for dizziness, tremors, seizures, syncope, facial asymmetry, speech difficulty, weakness, light-headedness, numbness and headaches.         Current Medications       Current Outpatient Medications:     Calcium Carbonate-Vit D-Min (Caltrate 600+D Plus Minerals) 600-800 MG-UNIT TABS, Take 1 tablet by mouth 2 (two) times a day, Disp: , Rfl:     doxycycline (ADOXA) 100 MG tablet, Take 1 tablet (100 mg total) by mouth 2 (two) times a day for 7 days, Disp: 14 tablet, Rfl: 0    fluticasone (FLONASE) 50 mcg/act nasal spray, 2 sprays into each nostril daily, Disp: 15.8 mL, Rfl: 1    methylPREDNISolone 4 MG tablet therapy pack, Use as directed on package, Disp: 21 tablet, Rfl: 0    zolpidem (AMBIEN) 10 mg tablet, Take 1 tablet (10 mg total) by mouth daily at bedtime as needed for sleep, Disp: 90 tablet, Rfl: 3    acyclovir (ZOVIRAX) 5 % ointment, Apply topically 3 (three) times a day for 7 days, Disp: 30 g, Rfl: 1    benzonatate (TESSALON) 200 MG capsule, Take 1 capsule (200 mg total) by mouth 3 (three) times a day as needed for cough (Patient not taking: Reported on 8/12/2024), Disp: 20 capsule, Rfl: 0    valACYclovir (VALTREX) 1,000 mg tablet, Take 1 tablet (1,000 mg total) by mouth 2 (two) times a day for 7 days, Disp: 14 tablet, Rfl: 0    Current Allergies     Allergies as of 08/12/2024 - Reviewed 08/12/2024   Allergen Reaction Noted    Bee venom Anaphylaxis 08/29/2016    Other Rash 07/21/1996    Codeine Other (See Comments) and Hallucinations 10/15/1999            The following portions of the patient's history were reviewed and updated as  appropriate: allergies, current medications, past family history, past medical history, past social history, past surgical history and problem list.     Past Medical History:   Diagnosis Date    Appendicitis     AGE 35    Cancer (HCC)     leukemia    Depression     Deviated nasal septum 03/1996    Head trauma 11/2003    DUE TO FALL; HOSPITALIZATION    Heart murmur     ONSET AT AGE 23    Herniated disc, cervical 06/1996       Past Surgical History:   Procedure Laterality Date    APPENDECTOMY      AGE 35    BACK SURGERY      CHOLECYSTECTOMY      CLAVICLE SURGERY Left     HARDWARE IN    DEBRIDEMENT TENNIS ELBOW Right     R TENNIS ELBOW SX    LUMBAR DISC SURGERY  06/1996    HERNIATED DISC; X2    PROSTATE SURGERY  02/2024    RHINOPLASTY  03/1996    DEDVIATED NASAL SEPTUM    UVULOPALATOPHARYNGOPLASTY      LISTED 2 X; RESOLVED WITH SURGERY       Family History   Problem Relation Age of Onset    Heart failure Father     Brain cancer Sister          Medications have been verified.        Objective   BP (!) 139/102   Pulse (!) 109   Temp 98.6 °F (37 °C) (Temporal)   Resp 18   SpO2 96%   No LMP for male patient.       Physical Exam     Physical Exam  Vitals and nursing note reviewed.   Constitutional:       General: He is not in acute distress.     Appearance: Normal appearance. He is normal weight. He is not ill-appearing or toxic-appearing.   HENT:      Head: Normocephalic and atraumatic.      Right Ear: Tympanic membrane, ear canal and external ear normal. No drainage, swelling or tenderness. No middle ear effusion. Tympanic membrane is not erythematous.      Left Ear: Tympanic membrane, ear canal and external ear normal. No drainage, swelling or tenderness.  No middle ear effusion. Tympanic membrane is not erythematous.      Nose: Mucosal edema and rhinorrhea present. No nasal deformity, signs of injury, nasal tenderness or congestion. Rhinorrhea is purulent.      Right Turbinates: Enlarged and swollen.      Left  Turbinates: Enlarged and swollen.      Right Sinus: Maxillary sinus tenderness and frontal sinus tenderness present.      Left Sinus: Maxillary sinus tenderness and frontal sinus tenderness present.      Mouth/Throat:      Mouth: Mucous membranes are moist. No oral lesions.      Pharynx: Oropharynx is clear. No pharyngeal swelling, oropharyngeal exudate, posterior oropharyngeal erythema or uvula swelling.      Tonsils: No tonsillar exudate or tonsillar abscesses. 0 on the right.   Eyes:      Extraocular Movements: Extraocular movements intact.      Right eye: Normal extraocular motion.      Left eye: Normal extraocular motion.      Conjunctiva/sclera: Conjunctivae normal.      Pupils: Pupils are equal, round, and reactive to light.   Neck:      Thyroid: No thyromegaly.   Cardiovascular:      Rate and Rhythm: Normal rate and regular rhythm.      Pulses: Normal pulses.      Heart sounds: Normal heart sounds.   Pulmonary:      Effort: Pulmonary effort is normal. No respiratory distress.      Breath sounds: Normal breath sounds. No stridor. No wheezing, rhonchi or rales.   Chest:      Chest wall: No tenderness.   Abdominal:      General: Abdomen is flat. There is no distension.      Palpations: There is no mass.      Tenderness: There is no abdominal tenderness. There is no right CVA tenderness, left CVA tenderness, guarding or rebound.      Hernia: No hernia is present.   Musculoskeletal:         General: No swelling, tenderness, deformity or signs of injury.      Cervical back: Normal range of motion and neck supple.      Left lower leg: No edema.   Lymphadenopathy:      Cervical: No cervical adenopathy.   Skin:     General: Skin is warm and dry.      Capillary Refill: Capillary refill takes less than 2 seconds.   Neurological:      General: No focal deficit present.      Mental Status: He is oriented to person, place, and time.      Cranial Nerves: No cranial nerve deficit.      Motor: No weakness.      Coordination:  Coordination normal.      Gait: Gait normal.      Deep Tendon Reflexes: Reflexes normal.   Psychiatric:         Behavior: Behavior normal.

## 2024-08-13 ENCOUNTER — OFFICE VISIT (OUTPATIENT)
Dept: FAMILY MEDICINE CLINIC | Facility: CLINIC | Age: 79
End: 2024-08-13
Payer: COMMERCIAL

## 2024-08-13 VITALS
HEART RATE: 88 BPM | WEIGHT: 199.2 LBS | BODY MASS INDEX: 28.58 KG/M2 | DIASTOLIC BLOOD PRESSURE: 76 MMHG | OXYGEN SATURATION: 92 % | TEMPERATURE: 97.9 F | SYSTOLIC BLOOD PRESSURE: 132 MMHG

## 2024-08-13 DIAGNOSIS — J06.9 UPPER RESPIRATORY TRACT INFECTION, UNSPECIFIED TYPE: Primary | ICD-10-CM

## 2024-08-13 DIAGNOSIS — C91.10 CLL (CHRONIC LYMPHOCYTIC LEUKEMIA) (HCC): Chronic | ICD-10-CM

## 2024-08-13 DIAGNOSIS — U07.1 COVID-19: ICD-10-CM

## 2024-08-13 PROCEDURE — G2211 COMPLEX E/M VISIT ADD ON: HCPCS

## 2024-08-13 PROCEDURE — 99213 OFFICE O/P EST LOW 20 MIN: CPT

## 2024-08-13 RX ORDER — BENZONATATE 200 MG/1
200 CAPSULE ORAL 3 TIMES DAILY PRN
Qty: 20 CAPSULE | Refills: 0 | Status: SHIPPED | OUTPATIENT
Start: 2024-08-13

## 2024-08-13 NOTE — ASSESSMENT & PLAN NOTE
Continues to have cough   Currently on doxycyclin   COVID 19 negative   Also using flonase and ibuprofen     PLAN:   - Continue doxy, flonase and ibuprofen   - Will start tessalon perles  - If no improvement to call office tomorrow   - Advised to use humidifier   - Strict ed precautions if worsen sx

## 2024-08-13 NOTE — PROGRESS NOTES
Ambulatory Visit  Name: Neftali Scott      : 1945      MRN: [id number]  Encounter Provider: Krishna Champagne MD  Encounter Date: 2024   Encounter department: [facility]    Assessment & Plan   1. Upper respiratory tract infection, unspecified type  Assessment & Plan:  Continues to have cough   Currently on doxycyclin   COVID 19 negative   Also using flonase and ibuprofen     PLAN:   - Continue doxy, flonase and ibuprofen   - Will start tessalon perles  - If no improvement to call office tomorrow   - Advised to use humidifier   - Strict ed precautions if worsen sx   2. COVID-19  -     benzonatate (TESSALON) 200 MG capsule; Take 1 capsule (200 mg total) by mouth 3 (three) times a day as needed for cough  3. CLL (chronic lymphocytic leukemia) (Formerly [facility])         History of Present Illness     Neftali Scott is a 79 y.o. male presenting today for follow up s/p viral illness.     Seen at urgent care and covid negative     Patient is accompanied by his wife Katelyn    Worsening cough keeping him up at night and causing him to go into coughing fits      URI   This is a recurrent problem. The current episode started in the past 7 days. The problem has been gradually worsening. There has been no fever. Associated symptoms include congestion, coughing, nausea and a sore throat. Pertinent negatives include no abdominal pain, chest pain, dysuria, ear pain, rash or vomiting. He has tried acetaminophen, decongestant and increased fluids for the symptoms. The treatment provided mild relief.   Review of Systems   Constitutional:  Negative for chills and fever.   HENT:  Positive for congestion and sore throat. Negative for ear pain.    Eyes:  Negative for pain and visual disturbance.   Respiratory:  Positive for cough. Negative for shortness of breath.    Cardiovascular:  Negative for chest pain and palpitations.   Gastrointestinal:  Positive for nausea. Negative for abdominal pain and  vomiting.   Genitourinary:  Negative for dysuria and hematuria.   Musculoskeletal:  Negative for arthralgias and back pain.   Skin:  Negative for color change and rash.   Neurological:  Negative for seizures and syncope.   All other systems reviewed and are negative.    Past Medical History:   Diagnosis Date   • Appendicitis     AGE 35   • Cancer (HCC)     leukemia   • Depression    • Deviated nasal septum 1996   • Head trauma 2003    DUE TO FALL; HOSPITALIZATION   • Heart murmur     ONSET AT AGE 23   • Herniated disc, cervical 1996     Past Surgical History:   Procedure Laterality Date   • APPENDECTOMY      AGE 35   • BACK SURGERY     • CHOLECYSTECTOMY     • CLAVICLE SURGERY Left     HARDWARE IN   • DEBRIDEMENT TENNIS ELBOW Right     R TENNIS ELBOW SX   • LUMBAR DISC SURGERY  1996    HERNIATED DISC; X2   • PROSTATE SURGERY  2024   • RHINOPLASTY  1996    DEDVIATED NASAL SEPTUM   • UVULOPALATOPHARYNGOPLASTY      LISTED 2 X; RESOLVED WITH SURGERY     Family History   Problem Relation Age of Onset   • Heart failure Father    • Brain cancer Sister      Social History     Tobacco Use   • Smoking status: Former     Current packs/day: 0.00     Types: Cigarettes     Quit date: 2003     Years since quittin.6   • Smokeless tobacco: Former   • Tobacco comments:     FORMER SMOKER, CIGARETTE TOBACCO USE; DATE QUIT: 03; NO PASSIVE SMOKE EXPOSURE AS PER [employer]   Vaping Use   • Vaping status: Never Used   Substance and Sexual Activity   • Alcohol use: No   • Drug use: No   • Sexual activity: Yes     Partners: Female     Current Outpatient Medications on File Prior to Visit   Medication Sig   • Calcium Carbonate-Vit D-Min (Caltrate 600+D Plus Minerals) 600-800 MG-UNIT TABS Take 1 tablet by mouth 2 (two) times a day   • doxycycline (ADOXA) 100 MG tablet Take 1 tablet (100 mg total) by mouth 2 (two) times a day for 7 days   • fluticasone (FLONASE) 50 mcg/act nasal spray 2 sprays into each nostril daily    • methylPREDNISolone 4 MG tablet therapy pack Use as directed on package   • zolpidem (AMBIEN) 10 mg tablet Take 1 tablet (10 mg total) by mouth daily at bedtime as needed for sleep   • acyclovir (ZOVIRAX) 5 % ointment Apply topically 3 (three) times a day for 7 days   • valACYclovir (VALTREX) 1,000 mg tablet Take 1 tablet (1,000 mg total) by mouth 2 (two) times a day for 7 days   • [DISCONTINUED] benzonatate (TESSALON) 200 MG capsule Take 1 capsule (200 mg total) by mouth 3 (three) times a day as needed for cough (Patient not taking: Reported on 8/12/2024)     Allergies   Allergen Reactions   • Bee Venom Anaphylaxis     One type of bee  Looks like a yellow striped wasp   • Other Rash     Other reaction(s): Decreased Blood Pressure     Immunization History   Administered Date(s) Administered   • COVID-19 MODERNA VACC 0.5 ML IM 02/10/2021, 02/24/2021, 03/24/2021   • H1N1 Inj 12/14/2009   • INFLUENZA 09/30/2016, 10/02/2017   • Influenza Split 12/22/2010   • Influenza Split High Dose Preservative Free IM 09/30/2016, 10/04/2017, 11/05/2018   • Influenza, high dose seasonal 0.7 mL 10/04/2018, 10/07/2019, 10/01/2020, 10/13/2020, 10/05/2021, 09/25/2022, 09/25/2023   • Influenza, seasonal, injectable 11/07/2008, 09/14/2009, 10/01/2016   • Pneumococcal Conjugate 13-Valent 09/30/2016   • Pneumococcal Polysaccharide PPV23 07/17/2014, 01/01/2015   • Tdap 12/22/2010, 11/17/2022     Objective     /76 (BP Location: Left arm, Patient Position: Sitting, Cuff Size: Large)   Pulse 88   Temp 97.9 °F (36.6 °C)   Wt 90.4 kg (199 lb 3.2 oz)   SpO2 92%   BMI 28.58 kg/m²     Physical Exam  Vitals and nursing note reviewed.   Constitutional:       General: He is not in acute distress.     Appearance: He is well-developed.   HENT:      Head: Normocephalic and atraumatic.   Eyes:      Conjunctiva/sclera: Conjunctivae normal.   Cardiovascular:      Rate and Rhythm: Normal rate and regular rhythm.      Heart sounds: No murmur  heard.  Pulmonary:      Effort: No tachypnea, accessory muscle usage or respiratory distress.      Breath sounds: No decreased air movement or transmitted upper airway sounds. Examination of the left-upper field reveals rhonchi. Rhonchi present. No decreased breath sounds.   Abdominal:      Palpations: Abdomen is soft.      Tenderness: There is no abdominal tenderness.   Musculoskeletal:         General: No swelling.      Cervical back: Neck supple.   Skin:     General: Skin is warm and dry.      Capillary Refill: Capillary refill takes less than 2 seconds.   Neurological:      Mental Status: He is alert.   Psychiatric:         Mood and Affect: Mood normal.

## 2024-08-20 ENCOUNTER — OFFICE VISIT (OUTPATIENT)
Dept: URGENT CARE | Facility: CLINIC | Age: 79
End: 2024-08-20
Payer: COMMERCIAL

## 2024-08-20 ENCOUNTER — APPOINTMENT (EMERGENCY)
Dept: CT IMAGING | Facility: HOSPITAL | Age: 79
End: 2024-08-20
Payer: COMMERCIAL

## 2024-08-20 ENCOUNTER — HOSPITAL ENCOUNTER (EMERGENCY)
Facility: HOSPITAL | Age: 79
Discharge: HOME | End: 2024-08-20
Attending: EMERGENCY MEDICINE
Payer: COMMERCIAL

## 2024-08-20 VITALS
DIASTOLIC BLOOD PRESSURE: 83 MMHG | OXYGEN SATURATION: 97 % | SYSTOLIC BLOOD PRESSURE: 172 MMHG | TEMPERATURE: 98.5 F | HEART RATE: 60 BPM | RESPIRATION RATE: 19 BRPM

## 2024-08-20 VITALS
DIASTOLIC BLOOD PRESSURE: 100 MMHG | TEMPERATURE: 97.4 F | HEART RATE: 64 BPM | RESPIRATION RATE: 18 BRPM | OXYGEN SATURATION: 97 % | SYSTOLIC BLOOD PRESSURE: 156 MMHG

## 2024-08-20 DIAGNOSIS — J32.9 SINUSITIS: ICD-10-CM

## 2024-08-20 DIAGNOSIS — R06.00 DYSPNEA, UNSPECIFIED TYPE: Primary | ICD-10-CM

## 2024-08-20 DIAGNOSIS — C91.10 CLL (CHRONIC LYMPHOCYTIC LEUKEMIA) (HCC): ICD-10-CM

## 2024-08-20 DIAGNOSIS — J18.9 PNEUMONIA: Primary | ICD-10-CM

## 2024-08-20 DIAGNOSIS — J98.01 BRONCHOSPASM: ICD-10-CM

## 2024-08-20 LAB
2HR DELTA HS TROPONIN: 0 NG/L
ALBUMIN SERPL BCG-MCNC: 3.9 G/DL (ref 3.5–5)
ALP SERPL-CCNC: 54 U/L (ref 34–104)
ALT SERPL W P-5'-P-CCNC: 36 U/L (ref 7–52)
ANION GAP SERPL CALCULATED.3IONS-SCNC: 5 MMOL/L (ref 4–13)
AST SERPL W P-5'-P-CCNC: 21 U/L (ref 13–39)
BASOPHILS # BLD MANUAL: 0 THOUSAND/UL (ref 0–0.1)
BASOPHILS NFR MAR MANUAL: 0 % (ref 0–1)
BILIRUB SERPL-MCNC: 0.49 MG/DL (ref 0.2–1)
BUN SERPL-MCNC: 14 MG/DL (ref 5–25)
CALCIUM SERPL-MCNC: 8.8 MG/DL (ref 8.4–10.2)
CARDIAC TROPONIN I PNL SERPL HS: 5 NG/L
CARDIAC TROPONIN I PNL SERPL HS: 5 NG/L
CHLORIDE SERPL-SCNC: 106 MMOL/L (ref 96–108)
CO2 SERPL-SCNC: 28 MMOL/L (ref 21–32)
CREAT SERPL-MCNC: 0.72 MG/DL (ref 0.6–1.3)
EOSINOPHIL # BLD MANUAL: 0.19 THOUSAND/UL (ref 0–0.4)
EOSINOPHIL NFR BLD MANUAL: 1 % (ref 0–6)
ERYTHROCYTE [DISTWIDTH] IN BLOOD BY AUTOMATED COUNT: 13.8 % (ref 11.6–15.1)
FLUAV RNA RESP QL NAA+PROBE: NEGATIVE
FLUBV RNA RESP QL NAA+PROBE: NEGATIVE
GFR SERPL CREATININE-BSD FRML MDRD: 89 ML/MIN/1.73SQ M
GLUCOSE SERPL-MCNC: 90 MG/DL (ref 65–140)
HCT VFR BLD AUTO: 43.7 % (ref 36.5–49.3)
HGB BLD-MCNC: 13.8 G/DL (ref 12–17)
LYMPHOCYTES # BLD AUTO: 13.55 THOUSAND/UL (ref 0.6–4.47)
LYMPHOCYTES # BLD AUTO: 71 % (ref 14–44)
MAGNESIUM SERPL-MCNC: 2.1 MG/DL (ref 1.9–2.7)
MCH RBC QN AUTO: 31.4 PG (ref 26.8–34.3)
MCHC RBC AUTO-ENTMCNC: 31.6 G/DL (ref 31.4–37.4)
MCV RBC AUTO: 99 FL (ref 82–98)
MONOCYTES # BLD AUTO: 0.38 THOUSAND/UL (ref 0–1.22)
MONOCYTES NFR BLD: 2 % (ref 4–12)
NEUTROPHILS # BLD MANUAL: 4.71 THOUSAND/UL (ref 1.85–7.62)
NEUTS SEG NFR BLD AUTO: 25 % (ref 43–75)
PLATELET # BLD AUTO: 299 THOUSANDS/UL (ref 149–390)
PLATELET BLD QL SMEAR: ADEQUATE
PMV BLD AUTO: 10.4 FL (ref 8.9–12.7)
POTASSIUM SERPL-SCNC: 4.3 MMOL/L (ref 3.5–5.3)
PROT SERPL-MCNC: 7 G/DL (ref 6.4–8.4)
RBC # BLD AUTO: 4.4 MILLION/UL (ref 3.88–5.62)
RBC MORPH BLD: NORMAL
RSV RNA RESP QL NAA+PROBE: NEGATIVE
SARS-COV-2 RNA RESP QL NAA+PROBE: NEGATIVE
SMUDGE CELLS BLD QL SMEAR: PRESENT
SODIUM SERPL-SCNC: 139 MMOL/L (ref 135–147)
VARIANT LYMPHS # BLD AUTO: 1 %
WBC # BLD AUTO: 18.82 THOUSAND/UL (ref 4.31–10.16)

## 2024-08-20 PROCEDURE — 36415 COLL VENOUS BLD VENIPUNCTURE: CPT | Performed by: EMERGENCY MEDICINE

## 2024-08-20 PROCEDURE — 71275 CT ANGIOGRAPHY CHEST: CPT

## 2024-08-20 PROCEDURE — NC001 PR NO CHARGE

## 2024-08-20 PROCEDURE — 0241U HB NFCT DS VIR RESP RNA 4 TRGT: CPT | Performed by: EMERGENCY MEDICINE

## 2024-08-20 PROCEDURE — 84484 ASSAY OF TROPONIN QUANT: CPT | Performed by: EMERGENCY MEDICINE

## 2024-08-20 PROCEDURE — 99285 EMERGENCY DEPT VISIT HI MDM: CPT

## 2024-08-20 PROCEDURE — 96360 HYDRATION IV INFUSION INIT: CPT

## 2024-08-20 PROCEDURE — 83735 ASSAY OF MAGNESIUM: CPT | Performed by: EMERGENCY MEDICINE

## 2024-08-20 PROCEDURE — 85007 BL SMEAR W/DIFF WBC COUNT: CPT | Performed by: EMERGENCY MEDICINE

## 2024-08-20 PROCEDURE — 80053 COMPREHEN METABOLIC PANEL: CPT | Performed by: EMERGENCY MEDICINE

## 2024-08-20 PROCEDURE — 93005 ELECTROCARDIOGRAM TRACING: CPT

## 2024-08-20 PROCEDURE — 94640 AIRWAY INHALATION TREATMENT: CPT

## 2024-08-20 PROCEDURE — 85027 COMPLETE CBC AUTOMATED: CPT | Performed by: EMERGENCY MEDICINE

## 2024-08-20 PROCEDURE — 99213 OFFICE O/P EST LOW 20 MIN: CPT | Performed by: EMERGENCY MEDICINE

## 2024-08-20 PROCEDURE — S9083 URGENT CARE CENTER GLOBAL: HCPCS | Performed by: EMERGENCY MEDICINE

## 2024-08-20 PROCEDURE — 99285 EMERGENCY DEPT VISIT HI MDM: CPT | Performed by: EMERGENCY MEDICINE

## 2024-08-20 RX ORDER — IPRATROPIUM BROMIDE AND ALBUTEROL SULFATE 2.5; .5 MG/3ML; MG/3ML
3 SOLUTION RESPIRATORY (INHALATION) ONCE
Status: COMPLETED | OUTPATIENT
Start: 2024-08-20 | End: 2024-08-20

## 2024-08-20 RX ORDER — LEVOFLOXACIN 500 MG/1
500 TABLET, FILM COATED ORAL DAILY
Qty: 5 TABLET | Refills: 0 | Status: SHIPPED | OUTPATIENT
Start: 2024-08-20 | End: 2024-08-25

## 2024-08-20 RX ADMIN — SODIUM CHLORIDE 1000 ML: 0.9 INJECTION, SOLUTION INTRAVENOUS at 14:22

## 2024-08-20 RX ADMIN — IOHEXOL 85 ML: 350 INJECTION, SOLUTION INTRAVENOUS at 17:39

## 2024-08-20 RX ADMIN — LEVOFLOXACIN 750 MG: 250 TABLET, FILM COATED ORAL at 19:03

## 2024-08-20 RX ADMIN — IPRATROPIUM BROMIDE AND ALBUTEROL SULFATE 3 ML: 2.5; .5 SOLUTION RESPIRATORY (INHALATION) at 14:22

## 2024-08-20 NOTE — PROGRESS NOTES
[facility]        NAME: Neftali Scott is a 79 y.o. male  : 1945    MRN: [id number]  DATE: 2024  TIME: 1:43 PM    Assessment and Plan   Dyspnea, unspecified type [R06.00]  1. Dyspnea, unspecified type  Transfer to other facility    r/o PE/pneumonia s/p Covid      2. Bronchospasm        This is pt's 5th visit to the  in the past month.  Patient recently had COVID and also treated with antibiotics and steroids and still is persistently short of breath.  I believe patient should have a CT to rule out PE due to his leukemia as well as his recent COVID.  I suspect patient could have a pneumonia and some bronchospasm.      I ambulated patient up and down the hallway and patient's pulse ox remained good and only dropped 1%.    Patient did have some shortness of breath.    Patient Instructions     Patient Instructions    Go directly to the ER - I think you need further testing, including a CT scan of your chest to r/o blood clot to your lung or pneumonia      Follow up with PCP in 3-5 days.  Proceed to  ER if symptoms worsen.    Chief Complaint     Chief Complaint   Patient presents with   • Cough     Pt was here last week for cough and chest congestion and SOB that started last week and was given meds from primary and finished yesterday and still feels bad even when walking struggling to catch breath and light headed. When sleeping at night rolling to left side starts coughing         History of Present Illness       79-year-old white male with a chief complaint of shortness of breath and dizziness while taking a short walk yesterday.  Patient recently had COVID and then he also was treated with an antibiotic and steroids for persistent cough and states he finished his medicine and still feels short of breath.        Review of Systems   Review of Systems   Constitutional:  Negative for diaphoresis, fatigue and fever.   HENT:  Negative for congestion, ear pain, nosebleeds and sore throat.     Eyes:  Negative for photophobia, pain, discharge and visual disturbance.   Respiratory:  Positive for cough, shortness of breath and wheezing. Negative for choking and chest tightness.    Cardiovascular:  Negative for chest pain and palpitations.   Gastrointestinal:  Negative for abdominal distention, abdominal pain, diarrhea and vomiting.   Genitourinary:  Negative for dysuria, flank pain and frequency.   Musculoskeletal:  Negative for back pain, gait problem and joint swelling.   Skin:  Negative for color change and rash.   Neurological:  Negative for dizziness, syncope and headaches.   Psychiatric/Behavioral:  Negative for behavioral problems and confusion. The patient is not nervous/anxious.    All other systems reviewed and are negative.        Current Medications       Current Outpatient Medications:   •  benzonatate (TESSALON) 200 MG capsule, Take 1 capsule (200 mg total) by mouth 3 (three) times a day as needed for cough, Disp: 20 capsule, Rfl: 0  •  Calcium Carbonate-Vit D-Min (Caltrate 600+D Plus Minerals) 600-800 MG-UNIT TABS, Take 1 tablet by mouth 2 (two) times a day, Disp: , Rfl:   •  fluticasone (FLONASE) 50 mcg/act nasal spray, 2 sprays into each nostril daily, Disp: 15.8 mL, Rfl: 1  •  methylPREDNISolone 4 MG tablet therapy pack, Use as directed on package, Disp: 21 tablet, Rfl: 0  •  zolpidem (AMBIEN) 10 mg tablet, Take 1 tablet (10 mg total) by mouth daily at bedtime as needed for sleep, Disp: 90 tablet, Rfl: 3  •  acyclovir (ZOVIRAX) 5 % ointment, Apply topically 3 (three) times a day for 7 days, Disp: 30 g, Rfl: 1  •  valACYclovir (VALTREX) 1,000 mg tablet, Take 1 tablet (1,000 mg total) by mouth 2 (two) times a day for 7 days, Disp: 14 tablet, Rfl: 0    Current Allergies     Allergies as of 08/20/2024 - Reviewed 08/20/2024   Allergen Reaction Noted   • Bee venom Anaphylaxis 08/29/2016   • Other Rash 07/21/1996            The following portions of the patient's history were reviewed and  updated as appropriate: allergies, current medications, past family history, past medical history, past social history, past surgical history and problem list.     Past Medical History:   Diagnosis Date   • Appendicitis     AGE 35   • Cancer (HCC)     leukemia   • Depression    • Deviated nasal septum 03/1996   • Head trauma 11/2003    DUE TO FALL; HOSPITALIZATION   • Heart murmur     ONSET AT AGE 23   • Herniated disc, cervical 06/1996       Past Surgical History:   Procedure Laterality Date   • APPENDECTOMY      AGE 35   • BACK SURGERY     • CHOLECYSTECTOMY     • CLAVICLE SURGERY Left     HARDWARE IN   • DEBRIDEMENT TENNIS ELBOW Right     R TENNIS ELBOW SX   • LUMBAR DISC SURGERY  06/1996    HERNIATED DISC; X2   • PROSTATE SURGERY  02/2024   • RHINOPLASTY  03/1996    DEDVIATED NASAL SEPTUM   • UVULOPALATOPHARYNGOPLASTY      LISTED 2 X; RESOLVED WITH SURGERY       Family History   Problem Relation Age of Onset   • Heart failure Father    • Brain cancer Sister          Medications have been verified.        Objective   /100   Pulse 64   Temp (!) 97.4 °F (36.3 °C) (Tympanic)   Resp 18   SpO2 97%        Physical Exam     Physical Exam  Vitals and nursing note reviewed.   Constitutional:       Appearance: Normal appearance.      Comments: Very pleasant 79-year-old male sitting on exam table complaining of shortness of breath   HENT:      Head: Normocephalic and atraumatic.      Nose: Nose normal.   Eyes:      Extraocular Movements: Extraocular movements intact.      Conjunctiva/sclera: Conjunctivae normal.   Cardiovascular:      Rate and Rhythm: Normal rate and regular rhythm.   Pulmonary:      Effort: Pulmonary effort is normal.      Breath sounds: Wheezing and rales present.      Comments: Patient has some expiratory wheezing in the upper and lower right lung and some rales at the base of the right lower lobe.  Musculoskeletal:         General: Normal range of motion.      Cervical back: Neck supple.       Comments: Patient has no calf tenderness.   Skin:     General: Skin is warm and dry.   Neurological:      General: No focal deficit present.      Mental Status: He is alert and oriented to person, place, and time.   Psychiatric:         Mood and Affect: Mood normal.

## 2024-08-20 NOTE — ED PROVIDER NOTES
Pt Name: Neftali Scott  MRN: [id number]  Birthdate 1945  Age/Sex: 79 y.o. male  Date of evaluation: 8/20/2024  PCP: Krishna Champagne MD    CHIEF COMPLAINT    Chief Complaint   Patient presents with    Shortness of Breath     Pt reporting SOB x 2 days. Noticed it got worse when he was walking today. Also has cough.          HPI and MDM    79 y.o. male presenting with shortness of breath.  Patient states a month ago he had COVID-19, he had Paxlovid and started feeling better.  However 2 weeks ago his granddaughter was sick and she was over there place and he got sick again.  At that time had a negative COVID test.  He recently finished a course of doxycycline yesterday, was also taking Tessalon Perles which he finished.  States however he feels like he is not getting better, feels like he is getting worse.  Gets short of breath with walking now.  No fevers recently.  No sore throat.  Mentions cough, is productive, worse at night.  Went to urgent care and was recommended come to the emergency department.      Differential diagnosis considered includes but not limited to viral respiratory infection, bacterial pneumonia, pneumothorax, ACS, PE.    EKG per my independent rotation shows sinus bradycardia heart of 59, narrow QRS, intervals reassuring, no STEMI.  Repeat EKG per my depend interpretation shows sinus bradycardia 4056, narrow QRS, intervals reassuring, no STEMI.    ED Course as of 08/24/24 1416   Tue Aug 20, 2024   1708 Patient care signed out to Dr. Rangel pending CT scan.        Medications   sodium chloride 0.9 % bolus 1,000 mL (0 mL Intravenous Stopped 8/20/24 1522)   ipratropium-albuterol (DUO-NEB) 0.5-2.5 mg/3 mL inhalation solution 3 mL (3 mL Nebulization Given 8/20/24 1422)   iohexol (OMNIPAQUE) 350 MG/ML injection (MULTI-DOSE) 85 mL (85 mL Intravenous Given 8/20/24 1739)   levofloxacin (LEVAQUIN) tablet 750 mg (750 mg Oral Given 8/20/24 1903)         Past Medical and Surgical  History    Past Medical History:   Diagnosis Date    Appendicitis     AGE 35    Cancer (HCC)     leukemia    Depression     Deviated nasal septum 1996    Head trauma 2003    DUE TO FALL; HOSPITALIZATION    Heart murmur     ONSET AT AGE 23    Herniated disc, cervical 1996       Past Surgical History:   Procedure Laterality Date    APPENDECTOMY      AGE 35    BACK SURGERY      CHOLECYSTECTOMY      CLAVICLE SURGERY Left     HARDWARE IN    DEBRIDEMENT TENNIS ELBOW Right     R TENNIS ELBOW SX    LUMBAR DISC SURGERY  1996    HERNIATED DISC; X2    PROSTATE SURGERY  2024    RHINOPLASTY  1996    DEDVIATED NASAL SEPTUM    UVULOPALATOPHARYNGOPLASTY      LISTED 2 X; RESOLVED WITH SURGERY       Family History   Problem Relation Age of Onset    Heart failure Father     Brain cancer Sister        Social History     Tobacco Use    Smoking status: Former     Current packs/day: 0.00     Types: Cigarettes     Quit date: 2003     Years since quittin.6    Smokeless tobacco: Former    Tobacco comments:     FORMER SMOKER, CIGARETTE TOBACCO USE; DATE QUIT: 03; NO PASSIVE SMOKE EXPOSURE AS PER [employer]   Vaping Use    Vaping status: Never Used   Substance Use Topics    Alcohol use: No    Drug use: No           Allergies    Allergies   Allergen Reactions    Bee Venom Anaphylaxis     One type of bee  Looks like a yellow striped wasp    Other Rash     Other reaction(s): Decreased Blood Pressure       Home Medications    Prior to Admission medications    Medication Sig Start Date End Date Taking? Authorizing Provider   acyclovir (ZOVIRAX) 5 % ointment Apply topically 3 (three) times a day for 7 days 24  Tiera Escalante DO   benzonatate (TESSALON) 200 MG capsule Take 1 capsule (200 mg total) by mouth 3 (three) times a day as needed for cough 24   Krishna Chapmagne MD   Calcium Carbonate-Vit D-Min (Caltrate 600+D Plus Minerals) 600-800 MG-UNIT TABS Take 1 tablet by mouth 2 (two) times a day  6/16/24   Vishnu Jones MD   doxycycline (ADOXA) 100 MG tablet Take 1 tablet (100 mg total) by mouth 2 (two) times a day for 7 days 8/12/24 8/19/24  Bertin Decker DO   fluticasone (FLONASE) 50 mcg/act nasal spray 2 sprays into each nostril daily 8/12/24 9/11/24  Bertin Decker DO   methylPREDNISolone 4 MG tablet therapy pack Use as directed on package 7/29/24   Carmen Gutierrez PA-C   valACYclovir (VALTREX) 1,000 mg tablet Take 1 tablet (1,000 mg total) by mouth 2 (two) times a day for 7 days 7/10/24 7/17/24  Carmen Gutierrez PA-C   zolpidem (AMBIEN) 10 mg tablet Take 1 tablet (10 mg total) by mouth daily at bedtime as needed for sleep 2/7/24   Vishnu Jones MD           Physical Exam      ED Triage Vitals [08/20/24 1336]   Temperature Pulse Respirations Blood Pressure SpO2   98.5 °F (36.9 °C) 70 20 (!) 205/97 97 %      Temp Source Heart Rate Source Patient Position - Orthostatic VS BP Location FiO2 (%)   Oral Monitor Sitting Left arm --      Pain Score       --               Physical Exam  Constitutional:       General: He is not in acute distress.     Appearance: He is not ill-appearing.   HENT:      Head: Normocephalic and atraumatic.      Nose: Nose normal.      Mouth/Throat:      Mouth: Mucous membranes are moist.   Eyes:      Extraocular Movements: Extraocular movements intact.      Pupils: Pupils are equal, round, and reactive to light.   Cardiovascular:      Rate and Rhythm: Normal rate and regular rhythm.   Pulmonary:      Effort: Pulmonary effort is normal. No respiratory distress.      Breath sounds: No stridor. No rhonchi or rales.      Comments: Occasional scattered wheezes  Abdominal:      General: There is no distension.      Tenderness: There is no abdominal tenderness.   Musculoskeletal:         General: No swelling or deformity. Normal range of motion.      Cervical back: Normal range of motion and neck supple.   Skin:     General: Skin is warm.      Findings: No  erythema.   Neurological:      Mental Status: He is alert and oriented to person, place, and time. Mental status is at baseline.              Diagnostic Results      Labs:    Results Reviewed       Procedure Component Value Units Date/Time    HS Troponin I 2hr [[id number]]  (Normal) Collected: 08/20/24 1627    Lab Status: Final result Specimen: Blood from Arm, Left Updated: 08/20/24 1659     hs TnI 2hr 5 ng/L      Delta 2hr hsTnI 0 ng/L     FLU/RSV/COVID - if FLU/RSV clinically relevant [[id number]]  (Normal) Collected: 08/20/24 1421    Lab Status: Final result Specimen: Nares from Nose Updated: 08/20/24 1507     SARS-CoV-2 Negative     INFLUENZA A PCR Negative     INFLUENZA B PCR Negative     RSV PCR Negative    Narrative:      This test has been performed using the CoV-2/Flu/RSV plus assay on the [employer] Propert platform. This test has been validated by the  and verified by the performing laboratory.     This test is designed to amplify and detect the following: nucleocapsid (N), envelope (E), and RNA-dependent RNA polymerase (RdRP) genes of the SARS-CoV-2 genome; matrix (M), basic polymerase (PB2), and acidic protein (PA) segments of the influenza A genome; matrix (M) and non-structural protein (NS) segments of the influenza B genome, and the nucleocapsid genes of RSV A and RSV B.     Positive results are indicative of the presence of Flu A, Flu B, RSV, and/or SARS-CoV-2 RNA. Positive results for SARS-CoV-2 or suspected novel influenza should be reported to state, local, or federal health departments according to local reporting requirements.      All results should be assessed in conjunction with clinical presentation and other laboratory markers for clinical management.     FOR PEDIATRIC PATIENTS - copy/paste COVID Guidelines URL to browser: [email or web address]       Manual Differential(PHLEBS Do Not Order) [[id number]]  (Abnormal) Collected:  08/20/24 1421    Lab Status: Final result Specimen: Blood from Arm, Left Updated: 08/20/24 1456     Segmented % 25 %      Lymphocytes % 71 %      Monocytes % 2 %      Eosinophils % 1 %      Basophils % 0 %      Atypical Lymphocytes % 1 %      Absolute Neutrophils 4.71 Thousand/uL      Absolute Lymphocytes 13.55 Thousand/uL      Absolute Monocytes 0.38 Thousand/uL      Absolute Eosinophils 0.19 Thousand/uL      Absolute Basophils 0.00 Thousand/uL      Total Counted --     Smudge Cells Present     RBC Morphology Normal     Platelet Estimate Adequate    HS Troponin 0hr (reflex protocol) [[id number]]  (Normal) Collected: 08/20/24 1421    Lab Status: Final result Specimen: Blood from Arm, Left Updated: 08/20/24 1456     hs TnI 0hr 5 ng/L     Comprehensive metabolic panel [[id number]] Collected: 08/20/24 1421    Lab Status: Final result Specimen: Blood from Arm, Left Updated: 08/20/24 1444     Sodium 139 mmol/L      Potassium 4.3 mmol/L      Chloride 106 mmol/L      CO2 28 mmol/L      ANION GAP 5 mmol/L      BUN 14 mg/dL      Creatinine 0.72 mg/dL      Glucose 90 mg/dL      Calcium 8.8 mg/dL      AST 21 U/L      ALT 36 U/L      Alkaline Phosphatase 54 U/L      Total Protein 7.0 g/dL      Albumin 3.9 g/dL      Total Bilirubin 0.49 mg/dL      eGFR 89 ml/min/1.73sq m     Narrative:      National Kidney Disease Foundation guidelines for Chronic Kidney Disease (CKD):     Stage 1 with normal or high GFR (GFR > 90 mL/min/1.73 square meters)    Stage 2 Mild CKD (GFR = 60-89 mL/min/1.73 square meters)    Stage 3A Moderate CKD (GFR = 45-59 mL/min/1.73 square meters)    Stage 3B Moderate CKD (GFR = 30-44 mL/min/1.73 square meters)    Stage 4 Severe CKD (GFR = 15-29 mL/min/1.73 square meters)    Stage 5 End Stage CKD (GFR <15 mL/min/1.73 square meters)  Note: GFR calculation is accurate only with a steady state creatinine    Magnesium [[id number]]  (Normal) Collected: 08/20/24 1421    Lab Status: Final result Specimen: Blood from Arm,  Left Updated: 08/20/24 1444     Magnesium 2.1 mg/dL     CBC and differential [[id number]]  (Abnormal) Collected: 08/20/24 1421    Lab Status: Final result Specimen: Blood from Arm, Left Updated: 08/20/24 1434     WBC 18.82 Thousand/uL      RBC 4.40 Million/uL      Hemoglobin 13.8 g/dL      Hematocrit 43.7 %      MCV 99 fL      MCH 31.4 pg      MCHC 31.6 g/dL      RDW 13.8 %      MPV 10.4 fL      Platelets 299 Thousands/uL             All labs reviewed and utilized in the medical decision making process    Radiology:    CTA ED chest PE study   Final Result      No pulmonary embolism.      Nodular infiltrate in the lingula along with small groundglass opacities in the left lower lobe suspicious for developing pneumonia.      Moderate peribronchial thickening particularly in the lower lobes likely chronic smoking-related bronchitis.                  Workstation performed: [id number]             All radiology studies independently viewed by me and interpreted by the radiologist.    Procedure    Procedures        FINAL IMPRESSION    Final diagnoses:   Pneumonia   CLL (chronic lymphocytic leukemia) (HCC)   Sinusitis         DISPOSITION    Time reflects when diagnosis was documented in both MDM as applicable and the Disposition within this note       Time User Action Codes Description Comment    8/20/2024  6:46 PM Yokubaitis, Billy Add [J18.9] Pneumonia     8/20/2024  6:46 PM Yokubaitis, Billy Add [C91.10] CLL (chronic lymphocytic leukemia) (HCC)     8/20/2024  6:46 PM Yokubaitis, Billy Add [J32.9] Sinusitis           ED Disposition       ED Disposition   Discharge    Condition   Stable    Date/Time   Tue Aug 20, 2024  6:46 PM    Comment   Neftali Scott discharge to home/self care.                   Follow-up Information       Follow up With Specialties Details Why Contact Info    Krishna Champagne MD Family Medicine   [address] [place] [id number]  [phone]                PATIENT REFERRED TO:    Krishna  Lynn Champagne MD  [address]  [facility] [address]  [phone]            DISCHARGE MEDICATIONS:    Discharge Medication List as of 8/20/2024  6:48 PM        START taking these medications    Details   levofloxacin (LEVAQUIN) 500 mg tablet Take 1 tablet (500 mg total) by mouth daily for 5 days, Starting Tue 8/20/2024, Until Sun 8/25/2024, Normal           CONTINUE these medications which have NOT CHANGED    Details   acyclovir (ZOVIRAX) 5 % ointment Apply topically 3 (three) times a day for 7 days, Starting Tue 7/16/2024, Until Tue 7/23/2024, Normal      benzonatate (TESSALON) 200 MG capsule Take 1 capsule (200 mg total) by mouth 3 (three) times a day as needed for cough, Starting Tue 8/13/2024, Normal      Calcium Carbonate-Vit D-Min (Caltrate 600+D Plus Minerals) 600-800 MG-UNIT TABS Take 1 tablet by mouth 2 (two) times a day, Starting Sun 6/16/2024, No Print      fluticasone (FLONASE) 50 mcg/act nasal spray 2 sprays into each nostril daily, Starting Mon 8/12/2024, Until Wed 9/11/2024, Normal      methylPREDNISolone 4 MG tablet therapy pack Use as directed on package, Normal      valACYclovir (VALTREX) 1,000 mg tablet Take 1 tablet (1,000 mg total) by mouth 2 (two) times a day for 7 days, Starting Wed 7/10/2024, Until Wed 7/17/2024, Normal      zolpidem (AMBIEN) 10 mg tablet Take 1 tablet (10 mg total) by mouth daily at bedtime as needed for sleep, Starting Wed 2/7/2024, Normal           STOP taking these medications       doxycycline (ADOXA) 100 MG tablet Comments:   Reason for Stopping:               No discharge procedures on file.         Arya Cash DO        This note was partially completed using voice recognition technology, and was scanned for gross errors; however some errors may still exist. Please contact the author with any questions or requests for clarification.      Arya Cash DO  08/24/24 8658

## 2024-08-20 NOTE — DISCHARGE INSTRUCTIONS
I recommend continuing to use the flonase and starting the levofloxacin for the residual pneumonia seen on your CT scan. You can also use mucinex to help with getting mucous out.     Please read the attached information for further guidance in home care and reasons to return to the ED.

## 2024-08-20 NOTE — PATIENT INSTRUCTIONS
Go directly to the ER - I think you need further testing, including a CT scan of your chest to r/o blood clot to your lung or pneumonia

## 2024-08-20 NOTE — ED PROVIDER NOTES
Signout from Dr. Cash    ED Course as of 08/20/24 1855   Tue Aug 20, 2024   1705 S/O: sob, dx with covid 1 mo ago and given paxlovid. Then got sick again. Likely deconditioning.  worried about PE so sent here for scan. Cough is productive, worse at night. Was on medrol dose pack 1 mo ago. Recently finished course of doxycycline.    1707 Can d/c if workup reassuring     MDM: CTPE study showing LLL groundglass opacities concerning for pneumonia. Discussed findings with patient including possibility that this is residual from recent treated covid/pna. However, given worsening symptoms, offered further treatment. Patient baseline immunocompromised in setting of CLL and was recently on doxycycline. Will switch to levofloxacin. Patient agreeable with plan. Remains hemodynamically stable, no hypoxia or signs of distress requiring inpatient treatment. Agreeable with discharge and PCP f/u in 1 week for reevaluation while treating with course of levofloxacin.      Billy Rangel MD  08/20/24 0035

## 2024-08-21 LAB
ATRIAL RATE: 59 BPM
P AXIS: 57 DEGREES
PR INTERVAL: 168 MS
QRS AXIS: -63 DEGREES
QRSD INTERVAL: 102 MS
QT INTERVAL: 418 MS
QTC INTERVAL: 413 MS
T WAVE AXIS: 2 DEGREES
VENTRICULAR RATE: 59 BPM

## 2024-08-21 PROCEDURE — 93010 ELECTROCARDIOGRAM REPORT: CPT | Performed by: INTERNAL MEDICINE

## 2024-08-22 LAB
ATRIAL RATE: 56 BPM
P AXIS: 54 DEGREES
PR INTERVAL: 186 MS
QRS AXIS: -59 DEGREES
QRSD INTERVAL: 98 MS
QT INTERVAL: 428 MS
QTC INTERVAL: 413 MS
T WAVE AXIS: -8 DEGREES
VENTRICULAR RATE: 56 BPM

## 2024-08-22 PROCEDURE — 93010 ELECTROCARDIOGRAM REPORT: CPT | Performed by: INTERNAL MEDICINE

## 2024-08-23 ENCOUNTER — RA CDI HCC (OUTPATIENT)
Dept: OTHER | Facility: HOSPITAL | Age: 79
End: 2024-08-23

## 2024-08-26 ENCOUNTER — OFFICE VISIT (OUTPATIENT)
Dept: FAMILY MEDICINE CLINIC | Facility: CLINIC | Age: 79
End: 2024-08-26
Payer: COMMERCIAL

## 2024-08-26 VITALS
HEART RATE: 73 BPM | BODY MASS INDEX: 28.87 KG/M2 | DIASTOLIC BLOOD PRESSURE: 82 MMHG | OXYGEN SATURATION: 97 % | SYSTOLIC BLOOD PRESSURE: 120 MMHG | TEMPERATURE: 97.9 F | WEIGHT: 201.2 LBS

## 2024-08-26 DIAGNOSIS — J18.9 PNEUMONIA OF LEFT LOWER LOBE DUE TO INFECTIOUS ORGANISM: Primary | ICD-10-CM

## 2024-08-26 PROCEDURE — G2211 COMPLEX E/M VISIT ADD ON: HCPCS

## 2024-08-26 PROCEDURE — 99213 OFFICE O/P EST LOW 20 MIN: CPT

## 2024-08-26 NOTE — PROGRESS NOTES
Ambulatory Visit  Name: Neftali Scott      : 1945      MRN: [id number]  Encounter Provider: Krishna Champagne MD  Encounter Date: 2024   Encounter department: [facility]    Assessment & Plan   1. Pneumonia of left lower lobe due to infectious organism  Assessment & Plan:  COVID which developed into pneumonia   Completed course of doxycyline and levofloxacin   Continues to have cough and requesting repeat Xray to ensure that pneumonia has not spread or worsen   Orders:  -     XR chest pa & lateral; Future; Expected date: 2024         History of Present Illness     Neftali Scott is a 79 y.o. male with pmhx of CLL presenting today for pneumonia follow up.        Seen the ED, ct done and found to have pneumonia and started on levofloxacin. Continues to have cough and sob.       Review of Systems   Constitutional:  Negative for chills and fever.   HENT:  Negative for ear pain and sore throat.    Eyes:  Negative for pain and visual disturbance.   Respiratory:  Negative for cough and shortness of breath.    Cardiovascular:  Negative for chest pain and palpitations.   Gastrointestinal:  Negative for abdominal pain and vomiting.   Genitourinary:  Negative for dysuria and hematuria.   Musculoskeletal:  Negative for arthralgias and back pain.   Skin:  Negative for color change and rash.   Neurological:  Negative for seizures and syncope.   All other systems reviewed and are negative.    Past Medical History:   Diagnosis Date   • Appendicitis     AGE 35   • Cancer (HCC)     leukemia   • Depression    • Deviated nasal septum 1996   • Head trauma 2003    DUE TO FALL; HOSPITALIZATION   • Heart murmur     ONSET AT AGE 23   • Herniated disc, cervical 1996     Past Surgical History:   Procedure Laterality Date   • APPENDECTOMY      AGE 35   • BACK SURGERY     • CHOLECYSTECTOMY     • CLAVICLE SURGERY Left     HARDWARE IN   • DEBRIDEMENT TENNIS ELBOW Right     R TENNIS  ELBOW SX   • LUMBAR DISC SURGERY  1996    HERNIATED DISC; X2   • PROSTATE SURGERY  2024   • RHINOPLASTY  1996    DEDVIATED NASAL SEPTUM   • UVULOPALATOPHARYNGOPLASTY      LISTED 2 X; RESOLVED WITH SURGERY     Family History   Problem Relation Age of Onset   • Heart failure Father    • Brain cancer Sister      Social History     Tobacco Use   • Smoking status: Former     Current packs/day: 0.00     Types: Cigarettes     Quit date: 2003     Years since quittin.6     Passive exposure: Past   • Smokeless tobacco: Former   • Tobacco comments:     FORMER SMOKER, CIGARETTE TOBACCO USE; DATE QUIT: 03; NO PASSIVE SMOKE EXPOSURE AS PER [employer]   Vaping Use   • Vaping status: Never Used   Substance and Sexual Activity   • Alcohol use: No   • Drug use: No   • Sexual activity: Yes     Partners: Female     Current Outpatient Medications on File Prior to Visit   Medication Sig   • fluticasone (FLONASE) 50 mcg/act nasal spray 2 sprays into each nostril daily   • zolpidem (AMBIEN) 10 mg tablet Take 1 tablet (10 mg total) by mouth daily at bedtime as needed for sleep   • acyclovir (ZOVIRAX) 5 % ointment Apply topically 3 (three) times a day for 7 days     Allergies   Allergen Reactions   • Bee Venom Anaphylaxis     One type of bee  Looks like a yellow striped wasp   • Other Rash     Other reaction(s): Decreased Blood Pressure     Immunization History   Administered Date(s) Administered   • COVID-19 MODERNA VACC 0.5 ML IM 02/10/2021, 2021, 2021   • H1N1 Inj 2009   • INFLUENZA 2016, 10/02/2017   • Influenza Split 2010   • Influenza Split High Dose Preservative Free IM 2016, 10/04/2017, 2018   • Influenza, high dose seasonal 0.7 mL 10/04/2018, 10/07/2019, 10/01/2020, 10/13/2020, 10/05/2021, 2022, 2023   • Influenza, seasonal, injectable 2008, 2009, 10/01/2016   • Pneumococcal Conjugate 13-Valent 2016   • Pneumococcal Polysaccharide PPV23  07/17/2014, 01/01/2015   • Tdap 12/22/2010, 11/17/2022     Objective     /82 (BP Location: Left arm, Patient Position: Sitting, Cuff Size: Standard)   Pulse 73   Temp 97.9 °F (36.6 °C) (Temporal)   Wt 91.3 kg (201 lb 3.2 oz)   SpO2 97%   BMI 28.87 kg/m²     Physical Exam  Vitals and nursing note reviewed.   Constitutional:       General: He is not in acute distress.     Appearance: He is well-developed.   HENT:      Head: Normocephalic and atraumatic.   Eyes:      Conjunctiva/sclera: Conjunctivae normal.   Cardiovascular:      Rate and Rhythm: Normal rate and regular rhythm.      Heart sounds: No murmur heard.  Pulmonary:      Effort: Pulmonary effort is normal. No respiratory distress.      Breath sounds: Normal breath sounds.   Abdominal:      Palpations: Abdomen is soft.      Tenderness: There is no abdominal tenderness.   Musculoskeletal:         General: No swelling.      Cervical back: Neck supple.   Skin:     General: Skin is warm and dry.      Capillary Refill: Capillary refill takes less than 2 seconds.   Neurological:      Mental Status: He is alert.   Psychiatric:         Mood and Affect: Mood normal.

## 2024-08-27 ENCOUNTER — APPOINTMENT (OUTPATIENT)
Dept: RADIOLOGY | Facility: CLINIC | Age: 79
End: 2024-08-27
Payer: COMMERCIAL

## 2024-08-27 DIAGNOSIS — J18.9 PNEUMONIA OF LEFT LOWER LOBE DUE TO INFECTIOUS ORGANISM: ICD-10-CM

## 2024-08-27 PROCEDURE — 71046 X-RAY EXAM CHEST 2 VIEWS: CPT

## 2024-08-28 PROBLEM — J18.9 PNEUMONIA OF LEFT LOWER LOBE DUE TO INFECTIOUS ORGANISM: Status: ACTIVE | Noted: 2024-08-28

## 2024-08-28 NOTE — ASSESSMENT & PLAN NOTE
COVID which developed into pneumonia   Completed course of doxycyline and levofloxacin   Continues to have cough and requesting repeat Xray to ensure that pneumonia has not spread or worsen

## 2024-09-27 PROBLEM — J18.9 PNEUMONIA OF LEFT LOWER LOBE DUE TO INFECTIOUS ORGANISM: Status: RESOLVED | Noted: 2024-08-28 | Resolved: 2024-09-27

## 2025-07-01 ENCOUNTER — RA CDI HCC (OUTPATIENT)
Dept: OTHER | Facility: HOSPITAL | Age: 80
End: 2025-07-01

## 2025-07-10 ENCOUNTER — CONSULT (OUTPATIENT)
Dept: FAMILY MEDICINE CLINIC | Facility: CLINIC | Age: 80
End: 2025-07-10
Payer: COMMERCIAL

## 2025-07-10 VITALS
BODY MASS INDEX: 29.87 KG/M2 | DIASTOLIC BLOOD PRESSURE: 62 MMHG | HEART RATE: 77 BPM | OXYGEN SATURATION: 95 % | WEIGHT: 208.2 LBS | SYSTOLIC BLOOD PRESSURE: 118 MMHG | TEMPERATURE: 97.7 F

## 2025-07-10 DIAGNOSIS — E78.49 OTHER HYPERLIPIDEMIA: Chronic | ICD-10-CM

## 2025-07-10 DIAGNOSIS — G47.00 INSOMNIA, UNSPECIFIED TYPE: ICD-10-CM

## 2025-07-10 DIAGNOSIS — C91.10 CLL (CHRONIC LYMPHOCYTIC LEUKEMIA) (HCC): Chronic | ICD-10-CM

## 2025-07-10 DIAGNOSIS — I10 PRIMARY HYPERTENSION: ICD-10-CM

## 2025-07-10 DIAGNOSIS — Z01.818 PREOP EXAMINATION: Primary | ICD-10-CM

## 2025-07-10 PROBLEM — J06.9 UPPER RESPIRATORY TRACT INFECTION: Status: RESOLVED | Noted: 2024-08-13 | Resolved: 2025-07-10

## 2025-07-10 PROBLEM — M06.9 RHEUMATOID ARTHRITIS INVOLVING MULTIPLE SITES (HCC): Chronic | Status: RESOLVED | Noted: 2021-10-14 | Resolved: 2025-07-10

## 2025-07-10 PROCEDURE — 99215 OFFICE O/P EST HI 40 MIN: CPT

## 2025-07-10 RX ORDER — LISINOPRIL 10 MG/1
1 TABLET ORAL DAILY
COMMUNITY
Start: 2025-05-01

## 2025-07-10 RX ORDER — ZOLPIDEM TARTRATE 10 MG/1
10 TABLET ORAL
Qty: 90 TABLET | Refills: 0 | Status: SHIPPED | OUTPATIENT
Start: 2025-07-10

## 2025-07-10 NOTE — PROGRESS NOTES
Pre-operative Clearance  Name: Neftali Scott      : 1945      MRN: [id number]  Encounter Provider: Krishna Champagne MD  Encounter Date: 7/10/2025   Encounter department: [facility]    :  Assessment & Plan  Preop examination         Other hyperlipidemia         CLL (chronic lymphocytic leukemia) (HCC)         Primary hypertension  Well controlled on lisinopril       Insomnia, unspecified type    Orders:  •  zolpidem (AMBIEN) 10 mg tablet; Take 1 tablet (10 mg total) by mouth daily at bedtime as needed for sleep        Pre-operative Clearance:     Revised Cardiac Risk Index:  RCI RISK CLASS I (0 risk factors, risk of major cardiac complications approximately 0.5%)    Clearance:  Patient is medically optimized (CLEARED) for proposed surgery without any additional cardiac testing.      Medication Instructions:   - Avoid herbs or non-directed vitamins one week prior to surgery    - Avoid aspirin containing medications or non-steroidal anti-inflammatory drugs one week preceding surgery    - May take tylenol for pain up until the night before surgery    - ACE Inhibitors or ARBs: Continue this medication up to the evening before surgery/procedure, but do not take the morning of the day of surgery.  - Sleep meds (zolpidem, zaleplon, eszopiclone): Continue taking medication up to the evening before procedure/surgery, but do not take this medication on the morning of procedure/surgery.       History of Present Illness     Pre-Op Examination     Surgery: Cataract with Lens implant   Anticipated Date of Surgery: 25   Surgeon: Dr. Vanessa     Previous history of bleeding disorders or clots?: No    Previous Anesthesia reaction?: No    Prolonged steroid use in the last 6 months?: No      Assessment of Cardiac Risk:   - Unstable or severe angina or MI in the last 6 weeks or history of stent placement in the last year?: No    - Decompensated heart failure (e.g. New onset heart failure,  NYHA  Class IV heart failure, or worsening existing heart failure)?: No    - Significant arrhythmias such as high grade AV block, symptomatic ventricular arrhythmia, newly recognized ventricular tachycardia, supraventricular tachycardia with resting heart rate >100, or symptomatic bradycardia?: No    - Severe heart valve disease including aortic stenosis or symptomatic mitral stenosis?: No      Pre-operative Risk Factors:  - Elevated-risk surgery: No    - History of cerebrovascular disease: No    - History of ischemic heart disease: No    - History of congestive heart failure: No    - Pre-operative treatment with insulin: No    - Pre-operative creatinine >2 mg/dL: No      [facility] Activity Status Index (DASI):    - DASI Total Score: 50.7   - METs: 9     Review of Systems   Constitutional:  Negative for chills and fever.   HENT:  Negative for ear pain and sore throat.    Eyes:  Negative for pain and visual disturbance.   Respiratory:  Negative for cough and shortness of breath.    Cardiovascular:  Negative for chest pain and palpitations.   Gastrointestinal:  Negative for abdominal pain and vomiting.   Genitourinary:  Negative for dysuria and hematuria.   Musculoskeletal:  Negative for arthralgias and back pain.   Skin:  Negative for color change and rash.   Neurological:  Negative for seizures and syncope.   All other systems reviewed and are negative.    Past Medical History   Past Medical History[1]  Past Surgical History[2]  Family History[3]  Social History[4]  Medications[5]  Allergies   Allergen Reactions   • Bee Venom Anaphylaxis     One type of bee  Looks like a yellow striped wasp   • Other Rash     Other reaction(s): Decreased Blood Pressure     Objective   /62 (BP Location: Left arm, Patient Position: Sitting, Cuff Size: Large)   Pulse 77   Temp 97.7 °F (36.5 °C)   Wt 94.4 kg (208 lb 3.2 oz)   SpO2 95%   BMI 29.87 kg/m²     Physical Exam  Vitals and nursing note reviewed.   Constitutional:        General: He is not in acute distress.     Appearance: He is well-developed.   HENT:      Head: Normocephalic and atraumatic.     Eyes:      Conjunctiva/sclera: Conjunctivae normal.       Cardiovascular:      Rate and Rhythm: Normal rate and regular rhythm.      Heart sounds: No murmur heard.  Pulmonary:      Effort: Pulmonary effort is normal. No respiratory distress.      Breath sounds: Normal breath sounds.   Abdominal:      Palpations: Abdomen is soft.      Tenderness: There is no abdominal tenderness.     Musculoskeletal:         General: No swelling.      Cervical back: Neck supple.     Skin:     General: Skin is warm and dry.      Capillary Refill: Capillary refill takes less than 2 seconds.     Neurological:      Mental Status: He is alert.     Psychiatric:         Mood and Affect: Mood normal.           Krishna Champagne MD         [1]  Past Medical History:  Diagnosis Date   • Appendicitis     AGE 35   • Cancer (HCC)     leukemia   • Depression    • Deviated nasal septum 1996   • Head trauma 2003    DUE TO FALL; HOSPITALIZATION   • Heart murmur     ONSET AT AGE 23   • Herniated disc, cervical 1996   [2]  Past Surgical History:  Procedure Laterality Date   • APPENDECTOMY      AGE 35   • BACK SURGERY     • CHOLECYSTECTOMY     • CLAVICLE SURGERY Left     HARDWARE IN   • DEBRIDEMENT TENNIS ELBOW Right     R TENNIS ELBOW SX   • LUMBAR DISC SURGERY  1996    HERNIATED DISC; X2   • PROSTATE SURGERY  2024   • RHINOPLASTY  1996    DEDVIATED NASAL SEPTUM   • UVULOPALATOPHARYNGOPLASTY      LISTED 2 X; RESOLVED WITH SURGERY   [3]  Family History  Problem Relation Name Age of Onset   • Heart failure Father     • Brain cancer Sister     [4]  Social History  Tobacco Use   • Smoking status: Former     Current packs/day: 0.00     Types: Cigarettes     Quit date: 2003     Years since quittin.5     Passive exposure: Past   • Smokeless tobacco: Former   • Tobacco comments:     FORMER SMOKER,  CIGARETTE TOBACCO USE; DATE QUIT: 1/1/03; NO PASSIVE SMOKE EXPOSURE AS PER [employer]   Vaping Use   • Vaping status: Never Used   Substance and Sexual Activity   • Alcohol use: No   • Drug use: No   • Sexual activity: Yes     Partners: Female   [5]  Current Outpatient Medications on File Prior to Visit   Medication Sig   • lisinopril (ZESTRIL) 10 mg tablet Take 1 tablet by mouth in the morning   • [DISCONTINUED] zolpidem (AMBIEN) 10 mg tablet Take 1 tablet (10 mg total) by mouth daily at bedtime as needed for sleep   • [DISCONTINUED] acyclovir (ZOVIRAX) 5 % ointment Apply topically 3 (three) times a day for 7 days